# Patient Record
Sex: FEMALE | Race: WHITE | Employment: UNEMPLOYED | URBAN - METROPOLITAN AREA
[De-identification: names, ages, dates, MRNs, and addresses within clinical notes are randomized per-mention and may not be internally consistent; named-entity substitution may affect disease eponyms.]

---

## 2021-03-05 ENCOUNTER — HOSPITAL ENCOUNTER (INPATIENT)
Age: 86
LOS: 5 days | Discharge: NURSING, REHAB OR LONG-TERM CARE | DRG: 184 | End: 2021-03-10
Attending: EMERGENCY MEDICINE | Admitting: SURGERY
Payer: MEDICARE

## 2021-03-05 ENCOUNTER — APPOINTMENT (OUTPATIENT)
Dept: CT IMAGING | Age: 86
DRG: 184 | End: 2021-03-05
Payer: MEDICARE

## 2021-03-05 DIAGNOSIS — S22.42XA CLOSED FRACTURE OF MULTIPLE RIBS OF LEFT SIDE, INITIAL ENCOUNTER: Primary | ICD-10-CM

## 2021-03-05 PROBLEM — Z87.81 HX OF FRACTURE OF RIB: Status: ACTIVE | Noted: 2021-03-05

## 2021-03-05 LAB
-: NORMAL
ALLEN TEST: ABNORMAL
ANION GAP SERPL CALCULATED.3IONS-SCNC: 13 MMOL/L (ref 9–17)
BLOOD BANK SPECIMEN: ABNORMAL
BUN BLDV-MCNC: 16 MG/DL (ref 8–23)
CARBOXYHEMOGLOBIN: ABNORMAL %
CHLORIDE BLD-SCNC: 101 MMOL/L (ref 98–107)
CO2: 19 MMOL/L (ref 20–31)
CREAT SERPL-MCNC: 0.57 MG/DL (ref 0.5–0.9)
ETHANOL PERCENT: <0.01 %
ETHANOL: <10 MG/DL
FIO2: ABNORMAL
GFR AFRICAN AMERICAN: >60 ML/MIN
GFR NON-AFRICAN AMERICAN: >60 ML/MIN
GFR SERPL CREATININE-BSD FRML MDRD: ABNORMAL ML/MIN/{1.73_M2}
GFR SERPL CREATININE-BSD FRML MDRD: ABNORMAL ML/MIN/{1.73_M2}
GLUCOSE BLD-MCNC: 141 MG/DL (ref 70–99)
HCG QUALITATIVE: NEGATIVE
HCO3 VENOUS: ABNORMAL MMOL/L (ref 24–30)
HCT VFR BLD CALC: 46.2 % (ref 36.3–47.1)
HEMOGLOBIN: 15.1 G/DL (ref 11.9–15.1)
INR BLD: 1.1
MCH RBC QN AUTO: 28.7 PG (ref 25.2–33.5)
MCHC RBC AUTO-ENTMCNC: 32.7 G/DL (ref 28.4–34.8)
MCV RBC AUTO: 87.8 FL (ref 82.6–102.9)
METHEMOGLOBIN: ABNORMAL %
MODE: ABNORMAL
NEGATIVE BASE EXCESS, VEN: ABNORMAL MMOL/L (ref 0–2)
NOTIFICATION TIME: ABNORMAL
NOTIFICATION: ABNORMAL
NRBC AUTOMATED: 0 PER 100 WBC
O2 DEVICE/FLOW/%: ABNORMAL
O2 SAT, VEN: ABNORMAL %
OXYHEMOGLOBIN: ABNORMAL % (ref 95–98)
PARTIAL THROMBOPLASTIN TIME: 18.8 SEC (ref 20.5–30.5)
PATIENT TEMP: ABNORMAL
PCO2, VEN, TEMP ADJ: ABNORMAL MMHG (ref 39–55)
PCO2, VEN: ABNORMAL (ref 39–55)
PDW BLD-RTO: 12.7 % (ref 11.8–14.4)
PEEP/CPAP: ABNORMAL
PH VENOUS: ABNORMAL (ref 7.32–7.42)
PH, VEN, TEMP ADJ: ABNORMAL (ref 7.32–7.42)
PLATELET # BLD: 256 K/UL (ref 138–453)
PMV BLD AUTO: 11.2 FL (ref 8.1–13.5)
PO2, VEN, TEMP ADJ: ABNORMAL MMHG (ref 30–50)
PO2, VEN: ABNORMAL (ref 30–50)
POSITIVE BASE EXCESS, VEN: ABNORMAL MMOL/L (ref 0–2)
POTASSIUM SERPL-SCNC: 4.3 MMOL/L (ref 3.7–5.3)
PROTHROMBIN TIME: 11.3 SEC (ref 9.1–12.3)
PSV: ABNORMAL
PT. POSITION: ABNORMAL
RBC # BLD: 5.26 M/UL (ref 3.95–5.11)
REASON FOR REJECTION: NORMAL
RESPIRATORY RATE: ABNORMAL
SAMPLE SITE: ABNORMAL
SARS-COV-2: NEGATIVE
SET RATE: ABNORMAL
SODIUM BLD-SCNC: 133 MMOL/L (ref 135–144)
TEXT FOR RESPIRATORY: ABNORMAL
TOTAL HB: ABNORMAL G/DL (ref 12–16)
TOTAL RATE: ABNORMAL
VT: ABNORMAL
WBC # BLD: 15 K/UL (ref 3.5–11.3)
ZZ NTE CLEAN UP: ORDERED TEST: NORMAL
ZZ NTE WITH NAME CLEAN UP: SPECIMEN SOURCE: NORMAL

## 2021-03-05 PROCEDURE — 82947 ASSAY GLUCOSE BLOOD QUANT: CPT

## 2021-03-05 PROCEDURE — 84520 ASSAY OF UREA NITROGEN: CPT

## 2021-03-05 PROCEDURE — 85730 THROMBOPLASTIN TIME PARTIAL: CPT

## 2021-03-05 PROCEDURE — 84703 CHORIONIC GONADOTROPIN ASSAY: CPT

## 2021-03-05 PROCEDURE — 2500000003 HC RX 250 WO HCPCS: Performed by: STUDENT IN AN ORGANIZED HEALTH CARE EDUCATION/TRAINING PROGRAM

## 2021-03-05 PROCEDURE — 6360000002 HC RX W HCPCS: Performed by: STUDENT IN AN ORGANIZED HEALTH CARE EDUCATION/TRAINING PROGRAM

## 2021-03-05 PROCEDURE — 3209999900 CT LUMBAR SPINE TRAUMA RECONSTRUCTION

## 2021-03-05 PROCEDURE — 80051 ELECTROLYTE PANEL: CPT

## 2021-03-05 PROCEDURE — 85027 COMPLETE CBC AUTOMATED: CPT

## 2021-03-05 PROCEDURE — 93005 ELECTROCARDIOGRAM TRACING: CPT

## 2021-03-05 PROCEDURE — 82565 ASSAY OF CREATININE: CPT

## 2021-03-05 PROCEDURE — 82805 BLOOD GASES W/O2 SATURATION: CPT

## 2021-03-05 PROCEDURE — 2580000003 HC RX 258: Performed by: STUDENT IN AN ORGANIZED HEALTH CARE EDUCATION/TRAINING PROGRAM

## 2021-03-05 PROCEDURE — 99283 EMERGENCY DEPT VISIT LOW MDM: CPT

## 2021-03-05 PROCEDURE — 3209999900 CT THORACIC SPINE TRAUMA RECONSTRUCTION

## 2021-03-05 PROCEDURE — 6360000004 HC RX CONTRAST MEDICATION: Performed by: STUDENT IN AN ORGANIZED HEALTH CARE EDUCATION/TRAINING PROGRAM

## 2021-03-05 PROCEDURE — 6370000000 HC RX 637 (ALT 250 FOR IP): Performed by: STUDENT IN AN ORGANIZED HEALTH CARE EDUCATION/TRAINING PROGRAM

## 2021-03-05 PROCEDURE — 2000000000 HC ICU R&B

## 2021-03-05 PROCEDURE — 85610 PROTHROMBIN TIME: CPT

## 2021-03-05 PROCEDURE — 71260 CT THORAX DX C+: CPT

## 2021-03-05 PROCEDURE — G0480 DRUG TEST DEF 1-7 CLASSES: HCPCS

## 2021-03-05 RX ORDER — METHOCARBAMOL 500 MG/1
750 TABLET, FILM COATED ORAL EVERY 6 HOURS SCHEDULED
Status: DISCONTINUED | OUTPATIENT
Start: 2021-03-06 | End: 2021-03-09

## 2021-03-05 RX ORDER — SODIUM CHLORIDE 0.9 % (FLUSH) 0.9 %
10 SYRINGE (ML) INJECTION EVERY 12 HOURS SCHEDULED
Status: DISCONTINUED | OUTPATIENT
Start: 2021-03-05 | End: 2021-03-10 | Stop reason: HOSPADM

## 2021-03-05 RX ORDER — IBUPROFEN 400 MG/1
400 TABLET ORAL EVERY 6 HOURS SCHEDULED
Status: DISCONTINUED | OUTPATIENT
Start: 2021-03-06 | End: 2021-03-10

## 2021-03-05 RX ORDER — ONDANSETRON 2 MG/ML
4 INJECTION INTRAMUSCULAR; INTRAVENOUS EVERY 6 HOURS PRN
Status: DISCONTINUED | OUTPATIENT
Start: 2021-03-05 | End: 2021-03-08

## 2021-03-05 RX ORDER — PROMETHAZINE HYDROCHLORIDE 12.5 MG/1
12.5 TABLET ORAL EVERY 6 HOURS PRN
Status: DISCONTINUED | OUTPATIENT
Start: 2021-03-05 | End: 2021-03-08

## 2021-03-05 RX ORDER — SODIUM CHLORIDE 0.9 % (FLUSH) 0.9 %
10 SYRINGE (ML) INJECTION PRN
Status: DISCONTINUED | OUTPATIENT
Start: 2021-03-05 | End: 2021-03-10 | Stop reason: HOSPADM

## 2021-03-05 RX ORDER — ACETAMINOPHEN 500 MG
1000 TABLET ORAL EVERY 8 HOURS SCHEDULED
Status: DISCONTINUED | OUTPATIENT
Start: 2021-03-05 | End: 2021-03-10 | Stop reason: HOSPADM

## 2021-03-05 RX ORDER — POLYETHYLENE GLYCOL 3350 17 G/17G
17 POWDER, FOR SOLUTION ORAL DAILY PRN
Status: DISCONTINUED | OUTPATIENT
Start: 2021-03-05 | End: 2021-03-10

## 2021-03-05 RX ORDER — GABAPENTIN 300 MG/1
300 CAPSULE ORAL EVERY 8 HOURS SCHEDULED
Status: DISCONTINUED | OUTPATIENT
Start: 2021-03-05 | End: 2021-03-09

## 2021-03-05 RX ADMIN — GABAPENTIN 300 MG: 300 CAPSULE ORAL at 23:58

## 2021-03-05 RX ADMIN — IOPAMIDOL 75 ML: 755 INJECTION, SOLUTION INTRAVENOUS at 19:05

## 2021-03-05 RX ADMIN — ENOXAPARIN SODIUM 30 MG: 30 INJECTION SUBCUTANEOUS at 23:57

## 2021-03-05 RX ADMIN — ACETAMINOPHEN 1000 MG: 500 TABLET ORAL at 22:36

## 2021-03-05 RX ADMIN — METHOCARBAMOL TABLETS 750 MG: 500 TABLET, COATED ORAL at 23:58

## 2021-03-05 RX ADMIN — KETAMINE HYDROCHLORIDE 0.2 MG/KG/HR: 50 INJECTION INTRAMUSCULAR; INTRAVENOUS at 20:54

## 2021-03-05 RX ADMIN — IBUPROFEN 400 MG: 400 TABLET, FILM COATED ORAL at 23:58

## 2021-03-05 ASSESSMENT — ENCOUNTER SYMPTOMS
COUGH: 0
SORE THROAT: 0
PHOTOPHOBIA: 0
SHORTNESS OF BREATH: 0
BACK PAIN: 1

## 2021-03-05 ASSESSMENT — PAIN SCALES - GENERAL
PAINLEVEL_OUTOF10: 6
PAINLEVEL_OUTOF10: 5

## 2021-03-05 ASSESSMENT — PAIN DESCRIPTION - LOCATION
LOCATION: RIB CAGE
LOCATION: RIB CAGE

## 2021-03-05 ASSESSMENT — PAIN DESCRIPTION - PAIN TYPE: TYPE: ACUTE PAIN

## 2021-03-05 ASSESSMENT — PAIN DESCRIPTION - FREQUENCY: FREQUENCY: CONTINUOUS

## 2021-03-05 ASSESSMENT — PAIN DESCRIPTION - ONSET: ONSET: UNABLE TO TELL

## 2021-03-05 ASSESSMENT — PAIN DESCRIPTION - ORIENTATION
ORIENTATION: LEFT
ORIENTATION: LEFT;POSTERIOR

## 2021-03-05 NOTE — ED NOTES
85f  Mb  Found on floor this am uknown down time. Dementia    % on room air   102bpm.   Bp 148/98  No blood thinners    Memantine donepazil. No head injury CT H&N negative    Multiple rib 3456 L       No pneumo on xray.         Julieta Ann, RN  03/05/21 5554

## 2021-03-05 NOTE — H&P
TRAUMA HISTORY AND PHYSICAL EXAMINATION    PATIENT NAME: Micheline Romo  YOB: 1935  MEDICAL RECORD NO. [id number]   DATE: 3/5/2021  PRIMARY CARE PHYSICIAN: No primary care provider on file. ACTIVATION   []Trauma Alert     [] Trauma Priority     [x]Trauma Consult. IMPRESSION:     Fall    MEDICAL DECISION MAKING AND PLAN:       Fall, unknown down time, Txr from [facility]    · Completion scan: CT C/A/P pending  · Inj: L 3-6 rib fx  · Trauma panel  · Consults:  ·  - pending scan  · Rib score: 9  · IS: 500  · Pain: MMT  · Diet: NPO  · Admission pending completion scan. Likely Step down given rib score        CONSULT SERVICES    [] Neurosurgery     [] Orthopedic Surgery    [] Cardiothoracic     [] Facial Trauma    [] Plastic Surgery (Burn)    [] Pediatric Surgery     [] Internal Medicine    [] Pulmonary Medicine    [] Other:        HISTORY:     Chief Complaint:  \"My ribs hurt\"    INJURY SUMMARY  L 3-6 rib fx    If intracranial hemorrhage is present, is it a BIG 1 category: [] YES  []NO    GENERAL DATA  Age 80 y.o.  female   Patient information was obtained from patient. History/Exam limitations: none. Patient presented to the Emergency Department by ambulance   Injury Date: 3/5/2021   Approximate Injury Time: unknown        Transport mode:   [x]Ambulance      [] Helicopter     []Car       [] Other  Referring Hospital: [address] [place] [address],   In her assisted living facility     MECHANISM OF INJURY         [x] Fall    [x]From Standing     []From Height  Ft     []Down Stairs ___steps    HISTORY:     Michleine Romo is a 80 y.o. female that presented to the Emergency Department as a transfer from [address] [place] [address] following a fall. Pt does not recall the fall and does not know if she was inside or outside when she fell. She does recall that she could not get up. Pt was found when someone came over to bring her meals. Unknown down time, either today or yesterday.  Imaging from [facility] showed negative C/T/L tenderness to palpation  EXTREMITIES: Without gross deformity, radial and PT/DP pulses +2 b/l  MSK:  No tenderness to palpation throughout, without gross deformity. C/T/L spine without TTP  NEURO:  A&Ox2, CN 2-12 grossly intact, sensation to light touch grossly intact BUE & BLE, motor strength 5/5  strength, wiggles toes, repositions self in bed independently  SKIN: Warm and dry    FOCUSED ABDOMINAL SONOGRAM FOR TRAUMA (FAST): A good  quality examination was performed by Dr. Neelam Muhammad and representative images were obtained.     [x] No free fluid in the abdomen   [] Free fluid in RUQ   [] Free fluid in LUQ  [] Free fluid in Pelvis  [] Pericardial fluid  [] Other:        RADIOLOGY  CT CHEST ABDOMEN PELVIS W CONTRAST    (Results Pending)         LABS    Labs Reviewed - No data to display      Raul Mckay DO  3/5/21, 5:50 PM

## 2021-03-05 NOTE — ED NOTES
Bed: 01  Expected date: 3/5/21  Expected time:   Means of arrival:   Comments:  JOSEMANUEL Muñoz  03/05/21 5742

## 2021-03-05 NOTE — ED PROVIDER NOTES
[facility]  ED  Emergency Department Encounter  Emergency Medicine Resident     Pt Name: Marilynn Gray  MRN: [id number]  Cayden 1935  Date of evaluation: 3/5/21  PCP:  Nohelia Lin  [id number]       Chief Complaint   Patient presents with    Rib Pain       HISTORY OFPRESENT ILLNESS  (Location/Symptom, Timing/Onset, Context/Setting, Quality, Duration, Modifying Factors,Severity.)      Marilynn Gray is a 80 y. o.yo female who presents with mechanical fall at the nursing home facility where she resides. Patient states that she was on the floor, with the hopes of somebody finding her. She is unsure if she fell yesterday or today. Patient has short-term memory defect in which she takes medication for. Patient complains of left-sided rib pain, no cervical spine tenderness or thoracic or lumbar spine tenderness. Patient also complains of left shoulder pain and left knee pain. She denies any loss of consciousness, denies any blood thinner usage. Patient was taken to order, they obtained a CT head which was negative for bleed, CT cervical spine was negative for fracture. Also obtain portable chest x-ray was demonstrated left rib fracture. Also obtain x-ray of left shoulder and x-ray of left tib-fib which were all negative for any acute fracture. She was subsequently transferred here for further work up. PAST MEDICAL / SURGICAL / SOCIAL / FAMILY HISTORY      has no past medical history on file. has no past surgical history on file.      Social History     Socioeconomic History    Marital status: Unknown     Spouse name: Not on file    Number of children: Not on file    Years of education: Not on file    Highest education level: Not on file   Occupational History    Not on file   Social Needs    Financial resource strain: Not on file    Food insecurity     Worry: Not on file     Inability: Not on file    Transportation needs     Medical: Not on file     Non-medical: Not on file   Tobacco Use    Smoking status: Not on file   Substance and Sexual Activity    Alcohol use: Not on file    Drug use: Not on file    Sexual activity: Not on file   Lifestyle    Physical activity     Days per week: Not on file     Minutes per session: Not on file    Stress: Not on file   Relationships    Social connections     Talks on phone: Not on file     Gets together: Not on file     Attends [employer]ism service: Not on file     Active member of club or organization: Not on file     Attends meetings of clubs or organizations: Not on file     Relationship status: Not on file    Intimate partner violence     Fear of current or ex partner: Not on file     Emotionally abused: Not on file     Physically abused: Not on file     Forced sexual activity: Not on file   Other Topics Concern    Not on file   Social History Narrative    Not on file       No family history on file. Allergies:  Patient has no allergy information on record. Home Medications:  Prior to Admission medications    Not on File       REVIEW OFSYSTEMS    (2-9 systems for level 4, 10 or more for level 5)      Review of Systems   Constitutional: Negative for fatigue and fever. HENT: Negative for sneezing and sore throat. Eyes: Negative for photophobia and visual disturbance. Respiratory: Negative for cough and shortness of breath. Cardiovascular: Positive for chest pain (Anterior left chest wall pain). Musculoskeletal: Positive for back pain (Posterior left shoulder pain). Negative for neck pain and neck stiffness. Neurological: Negative for light-headedness and headaches.        PHYSICAL EXAM   (up to 7 for level 4, 8 or more forlevel 5)      INITIAL VITALS:   ED Triage Vitals   BP Temp Temp Source Pulse Resp SpO2 Height Weight   03/05/21 1718 03/05/21 1754 03/05/21 1754 03/05/21 1724 03/05/21 1724 03/05/21 1718 -- --   (!) 145/103 98.1 °F (36.7 °C) Oral 102 23 94 %         Physical Exam  Constitutional: Appearance: Normal appearance. HENT:      Head: Normocephalic and atraumatic. Right Ear: Tympanic membrane normal.      Left Ear: Tympanic membrane normal.      Mouth/Throat:      Mouth: Mucous membranes are moist.   Eyes:      Extraocular Movements: Extraocular movements intact. Pupils: Pupils are equal, round, and reactive to light. Neck:      Musculoskeletal: No neck rigidity or muscular tenderness. Cardiovascular:      Rate and Rhythm: Tachycardia present. Heart sounds: Murmur present. Pulmonary:      Effort: Pulmonary effort is normal. No respiratory distress. Breath sounds: No stridor. Abdominal:      General: There is no distension. Tenderness: There is no abdominal tenderness. Musculoskeletal:         General: Tenderness (Over L knee and L shoulder) present. No swelling, deformity or signs of injury. Skin:     Capillary Refill: Capillary refill takes less than 2 seconds. Coloration: Skin is not jaundiced. Findings: No bruising. Neurological:      General: No focal deficit present. Mental Status: She is alert and oriented to person, place, and time. Psychiatric:         Mood and Affect: Mood normal.         Behavior: Behavior normal.         DIFFERENTIAL  DIAGNOSIS     PLAN (LABS / IMAGING / EKG):  Orders Placed This Encounter   Procedures    CT CHEST ABDOMEN PELVIS W CONTRAST    CT LUMBAR SPINE TRAUMA RECONSTRUCTION    CT THORACIC SPINE TRAUMA RECONSTRUCTION    TRAUMA PANEL    SPECIMEN REJECTION    Inpatient consult to Trauma Surgery    EKG 12 Lead    PATIENT STATUS (FROM ED OR OR/PROCEDURAL) Inpatient       MEDICATIONS ORDERED:  No orders of the defined types were placed in this encounter. DDX: Fall with rib fractures    Initial MDM/Plan: 80 y.o. female who presents with mechanical fall. Patient alert, oriented although she is poor historian. No obvious head traumatic injury.   Cervical spine nontender on palpation, pupils equal and reactive, lungs clear to auscultate bilaterally, presence of aortic stenosis murmur radiating to the carotids. Abdomen soft nontender nondistended. No bilateral lower extremity edema. Pulses intact in upper and lower extremity. Motor strength and sensation intact in upper and lower extremities. Plan for trauma consult, incentive spirometry patient: 500 cc,   bedside fast exam negative for free fluid in abdomen. Patient to be admitted. DIAGNOSTIC RESULTS / EMERGENCYDEPARTMENT COURSE / MDM     LABS:  Labs Reviewed   TRAUMA PANEL - Abnormal; Notable for the following components:       Result Value    WBC 15.0 (*)     RBC 5.26 (*)     PTT 18.8 (*)     All other components within normal limits   SPECIMEN REJECTION         RADIOLOGY:  No results found. EKG  EKG Interpretation    Interpreted by me    Rhythm: normal sinus   Rate: normal  Axis: normal  Ectopy: Premature ventricular complexes  Conduction: normal  ST Segments: no acute change  T Waves: no acute change  Q Waves: none    Clinical Impression: No previous EKG for comparison    All EKG's are interpreted by the Emergency Department Physicianwho either signs or Co-signs this chart in the absence of a cardiologist.    EMERGENCY DEPARTMENT COURSE:          PROCEDURES:  None    CONSULTS:  IP CONSULT TO TRAUMA SURGERY    CRITICAL CARE:      FINAL IMPRESSION      1. Closed fracture of multiple ribs of left side, initial encounter          DISPOSITION / [facility]. [address] Admitted 03/05/2021 06:04:53 PM      PATIENT REFERRED TO:  No follow-up provider specified.     DISCHARGE MEDICATIONS:  New Prescriptions    No medications on file       Jhoan Salazar MD  Emergency Medicine Resident    (Please note that portions of this note were completed with a voice recognition program.Efforts were made to edit the dictations but occasionally words are mis-transcribed.)      Jhoan Salazar MD  Resident  03/05/21 [address] Alivia Barriga MD  Resident  03/05/21 9096

## 2021-03-06 ENCOUNTER — APPOINTMENT (OUTPATIENT)
Dept: GENERAL RADIOLOGY | Age: 86
DRG: 184 | End: 2021-03-06
Payer: MEDICARE

## 2021-03-06 LAB
ABSOLUTE EOS #: 0 K/UL (ref 0–0.44)
ABSOLUTE IMMATURE GRANULOCYTE: 0 K/UL (ref 0–0.3)
ABSOLUTE LYMPH #: 2.65 K/UL (ref 1.1–3.7)
ABSOLUTE MONO #: 1.76 K/UL (ref 0.1–1.2)
ANION GAP SERPL CALCULATED.3IONS-SCNC: 12 MMOL/L (ref 9–17)
ANION GAP SERPL CALCULATED.3IONS-SCNC: 12 MMOL/L (ref 9–17)
BASOPHILS # BLD: 1 % (ref 0–2)
BASOPHILS ABSOLUTE: 0.13 K/UL (ref 0–0.2)
BUN BLDV-MCNC: 19 MG/DL (ref 8–23)
BUN BLDV-MCNC: 25 MG/DL (ref 8–23)
BUN/CREAT BLD: ABNORMAL (ref 9–20)
BUN/CREAT BLD: NORMAL (ref 9–20)
CALCIUM SERPL-MCNC: 8.7 MG/DL (ref 8.6–10.4)
CALCIUM SERPL-MCNC: 8.7 MG/DL (ref 8.6–10.4)
CHLORIDE BLD-SCNC: 100 MMOL/L (ref 98–107)
CHLORIDE BLD-SCNC: 106 MMOL/L (ref 98–107)
CO2: 20 MMOL/L (ref 20–31)
CO2: 21 MMOL/L (ref 20–31)
CREAT SERPL-MCNC: 0.7 MG/DL (ref 0.5–0.9)
CREAT SERPL-MCNC: 0.97 MG/DL (ref 0.5–0.9)
DIFFERENTIAL TYPE: ABNORMAL
EKG ATRIAL RATE: 91 BPM
EKG P AXIS: 79 DEGREES
EKG P-R INTERVAL: 170 MS
EKG Q-T INTERVAL: 396 MS
EKG QRS DURATION: 92 MS
EKG QTC CALCULATION (BAZETT): 487 MS
EKG R AXIS: -8 DEGREES
EKG T AXIS: 46 DEGREES
EKG VENTRICULAR RATE: 91 BPM
EOSINOPHILS RELATIVE PERCENT: 0 % (ref 1–4)
GFR AFRICAN AMERICAN: >60 ML/MIN
GFR AFRICAN AMERICAN: >60 ML/MIN
GFR NON-AFRICAN AMERICAN: 55 ML/MIN
GFR NON-AFRICAN AMERICAN: >60 ML/MIN
GFR SERPL CREATININE-BSD FRML MDRD: ABNORMAL ML/MIN/{1.73_M2}
GFR SERPL CREATININE-BSD FRML MDRD: ABNORMAL ML/MIN/{1.73_M2}
GFR SERPL CREATININE-BSD FRML MDRD: NORMAL ML/MIN/{1.73_M2}
GFR SERPL CREATININE-BSD FRML MDRD: NORMAL ML/MIN/{1.73_M2}
GLUCOSE BLD-MCNC: 160 MG/DL (ref 70–99)
GLUCOSE BLD-MCNC: 97 MG/DL (ref 70–99)
HCT VFR BLD CALC: 45.3 % (ref 36.3–47.1)
HEMOGLOBIN: 14.3 G/DL (ref 11.9–15.1)
IMMATURE GRANULOCYTES: 0 %
LYMPHOCYTES # BLD: 21 % (ref 24–43)
MAGNESIUM: 2.1 MG/DL (ref 1.6–2.6)
MCH RBC QN AUTO: 28.2 PG (ref 25.2–33.5)
MCHC RBC AUTO-ENTMCNC: 31.6 G/DL (ref 28.4–34.8)
MCV RBC AUTO: 89.3 FL (ref 82.6–102.9)
MONOCYTES # BLD: 14 % (ref 3–12)
MORPHOLOGY: NORMAL
MYOGLOBIN: 124 NG/ML (ref 25–58)
NRBC AUTOMATED: 0 PER 100 WBC
PDW BLD-RTO: 13.1 % (ref 11.8–14.4)
PHOSPHORUS: 3.9 MG/DL (ref 2.6–4.5)
PLATELET # BLD: 237 K/UL (ref 138–453)
PLATELET ESTIMATE: ABNORMAL
PMV BLD AUTO: 10.9 FL (ref 8.1–13.5)
POTASSIUM SERPL-SCNC: 3.7 MMOL/L (ref 3.7–5.3)
POTASSIUM SERPL-SCNC: 4 MMOL/L (ref 3.7–5.3)
RBC # BLD: 5.07 M/UL (ref 3.95–5.11)
RBC # BLD: ABNORMAL 10*6/UL
SEG NEUTROPHILS: 64 % (ref 36–65)
SEGMENTED NEUTROPHILS ABSOLUTE COUNT: 8.06 K/UL (ref 1.5–8.1)
SODIUM BLD-SCNC: 133 MMOL/L (ref 135–144)
SODIUM BLD-SCNC: 138 MMOL/L (ref 135–144)
TOTAL CK: 170 U/L (ref 26–192)
WBC # BLD: 12.6 K/UL (ref 3.5–11.3)
WBC # BLD: ABNORMAL 10*3/UL

## 2021-03-06 PROCEDURE — 51701 INSERT BLADDER CATHETER: CPT

## 2021-03-06 PROCEDURE — 71045 X-RAY EXAM CHEST 1 VIEW: CPT

## 2021-03-06 PROCEDURE — 2580000003 HC RX 258: Performed by: STUDENT IN AN ORGANIZED HEALTH CARE EDUCATION/TRAINING PROGRAM

## 2021-03-06 PROCEDURE — 6360000002 HC RX W HCPCS: Performed by: STUDENT IN AN ORGANIZED HEALTH CARE EDUCATION/TRAINING PROGRAM

## 2021-03-06 PROCEDURE — 2000000000 HC ICU R&B

## 2021-03-06 PROCEDURE — 97116 GAIT TRAINING THERAPY: CPT

## 2021-03-06 PROCEDURE — 97535 SELF CARE MNGMENT TRAINING: CPT

## 2021-03-06 PROCEDURE — 84100 ASSAY OF PHOSPHORUS: CPT

## 2021-03-06 PROCEDURE — 6370000000 HC RX 637 (ALT 250 FOR IP): Performed by: STUDENT IN AN ORGANIZED HEALTH CARE EDUCATION/TRAINING PROGRAM

## 2021-03-06 PROCEDURE — 97162 PT EVAL MOD COMPLEX 30 MIN: CPT

## 2021-03-06 PROCEDURE — 36415 COLL VENOUS BLD VENIPUNCTURE: CPT

## 2021-03-06 PROCEDURE — 51798 US URINE CAPACITY MEASURE: CPT

## 2021-03-06 PROCEDURE — 82550 ASSAY OF CK (CPK): CPT

## 2021-03-06 PROCEDURE — 73562 X-RAY EXAM OF KNEE 3: CPT

## 2021-03-06 PROCEDURE — 83874 ASSAY OF MYOGLOBIN: CPT

## 2021-03-06 PROCEDURE — 85025 COMPLETE CBC W/AUTO DIFF WBC: CPT

## 2021-03-06 PROCEDURE — 83735 ASSAY OF MAGNESIUM: CPT

## 2021-03-06 PROCEDURE — 2700000000 HC OXYGEN THERAPY PER DAY

## 2021-03-06 PROCEDURE — 80048 BASIC METABOLIC PNL TOTAL CA: CPT

## 2021-03-06 PROCEDURE — 94761 N-INVAS EAR/PLS OXIMETRY MLT: CPT

## 2021-03-06 PROCEDURE — 97166 OT EVAL MOD COMPLEX 45 MIN: CPT

## 2021-03-06 PROCEDURE — 73030 X-RAY EXAM OF SHOULDER: CPT

## 2021-03-06 RX ORDER — MEMANTINE HYDROCHLORIDE 28 MG/1
28 CAPSULE, EXTENDED RELEASE ORAL DAILY
COMMUNITY

## 2021-03-06 RX ORDER — DONEPEZIL HYDROCHLORIDE 10 MG/1
10 TABLET, FILM COATED ORAL NIGHTLY
COMMUNITY

## 2021-03-06 RX ORDER — MAGNESIUM SULFATE IN WATER 40 MG/ML
2000 INJECTION, SOLUTION INTRAVENOUS ONCE
Status: COMPLETED | OUTPATIENT
Start: 2021-03-06 | End: 2021-03-06

## 2021-03-06 RX ORDER — SODIUM CHLORIDE, SODIUM LACTATE, POTASSIUM CHLORIDE, AND CALCIUM CHLORIDE .6; .31; .03; .02 G/100ML; G/100ML; G/100ML; G/100ML
500 INJECTION, SOLUTION INTRAVENOUS ONCE
Status: COMPLETED | OUTPATIENT
Start: 2021-03-06 | End: 2021-03-06

## 2021-03-06 RX ORDER — MEMANTINE HYDROCHLORIDE 28 MG/1
28 CAPSULE, EXTENDED RELEASE ORAL DAILY
Status: DISCONTINUED | OUTPATIENT
Start: 2021-03-06 | End: 2021-03-06 | Stop reason: CLARIF

## 2021-03-06 RX ORDER — DONEPEZIL HYDROCHLORIDE 10 MG/1
10 TABLET, FILM COATED ORAL NIGHTLY
Status: DISCONTINUED | OUTPATIENT
Start: 2021-03-06 | End: 2021-03-10 | Stop reason: HOSPADM

## 2021-03-06 RX ORDER — SODIUM CHLORIDE, SODIUM LACTATE, POTASSIUM CHLORIDE, CALCIUM CHLORIDE 600; 310; 30; 20 MG/100ML; MG/100ML; MG/100ML; MG/100ML
INJECTION, SOLUTION INTRAVENOUS CONTINUOUS
Status: DISCONTINUED | OUTPATIENT
Start: 2021-03-06 | End: 2021-03-07

## 2021-03-06 RX ORDER — MEMANTINE HYDROCHLORIDE 5 MG/1
10 TABLET ORAL 2 TIMES DAILY
Status: DISCONTINUED | OUTPATIENT
Start: 2021-03-06 | End: 2021-03-10 | Stop reason: HOSPADM

## 2021-03-06 RX ADMIN — METHOCARBAMOL TABLETS 750 MG: 500 TABLET, COATED ORAL at 23:25

## 2021-03-06 RX ADMIN — IBUPROFEN 400 MG: 400 TABLET, FILM COATED ORAL at 13:10

## 2021-03-06 RX ADMIN — SODIUM CHLORIDE, PRESERVATIVE FREE 10 ML: 5 INJECTION INTRAVENOUS at 08:19

## 2021-03-06 RX ADMIN — GABAPENTIN 300 MG: 300 CAPSULE ORAL at 14:52

## 2021-03-06 RX ADMIN — SODIUM CHLORIDE, POTASSIUM CHLORIDE, SODIUM LACTATE AND CALCIUM CHLORIDE: 600; 310; 30; 20 INJECTION, SOLUTION INTRAVENOUS at 20:40

## 2021-03-06 RX ADMIN — ACETAMINOPHEN 1000 MG: 500 TABLET ORAL at 21:05

## 2021-03-06 RX ADMIN — IBUPROFEN 400 MG: 400 TABLET, FILM COATED ORAL at 06:08

## 2021-03-06 RX ADMIN — MAGNESIUM SULFATE 2000 MG: 2 INJECTION INTRAVENOUS at 08:34

## 2021-03-06 RX ADMIN — ENOXAPARIN SODIUM 30 MG: 30 INJECTION SUBCUTANEOUS at 08:19

## 2021-03-06 RX ADMIN — IBUPROFEN 400 MG: 400 TABLET, FILM COATED ORAL at 18:04

## 2021-03-06 RX ADMIN — ACETAMINOPHEN 1000 MG: 500 TABLET ORAL at 06:11

## 2021-03-06 RX ADMIN — SODIUM CHLORIDE, PRESERVATIVE FREE 10 ML: 5 INJECTION INTRAVENOUS at 20:55

## 2021-03-06 RX ADMIN — MEMANTINE HYDROCHLORIDE 10 MG: 5 TABLET, FILM COATED ORAL at 20:55

## 2021-03-06 RX ADMIN — METHOCARBAMOL TABLETS 750 MG: 500 TABLET, COATED ORAL at 06:08

## 2021-03-06 RX ADMIN — GABAPENTIN 300 MG: 300 CAPSULE ORAL at 08:19

## 2021-03-06 RX ADMIN — GABAPENTIN 300 MG: 300 CAPSULE ORAL at 21:40

## 2021-03-06 RX ADMIN — MEMANTINE HYDROCHLORIDE 10 MG: 5 TABLET, FILM COATED ORAL at 14:51

## 2021-03-06 RX ADMIN — DONEPEZIL HYDROCHLORIDE 10 MG: 10 TABLET, FILM COATED ORAL at 20:55

## 2021-03-06 RX ADMIN — METHOCARBAMOL TABLETS 750 MG: 500 TABLET, COATED ORAL at 18:05

## 2021-03-06 RX ADMIN — ENOXAPARIN SODIUM 30 MG: 30 INJECTION SUBCUTANEOUS at 20:55

## 2021-03-06 RX ADMIN — IBUPROFEN 400 MG: 400 TABLET, FILM COATED ORAL at 23:25

## 2021-03-06 RX ADMIN — ACETAMINOPHEN 1000 MG: 500 TABLET ORAL at 14:54

## 2021-03-06 RX ADMIN — METHOCARBAMOL TABLETS 750 MG: 500 TABLET, COATED ORAL at 13:11

## 2021-03-06 RX ADMIN — SODIUM CHLORIDE, POTASSIUM CHLORIDE, SODIUM LACTATE AND CALCIUM CHLORIDE 500 ML: 600; 310; 30; 20 INJECTION, SOLUTION INTRAVENOUS at 13:14

## 2021-03-06 ASSESSMENT — PAIN DESCRIPTION - ORIENTATION
ORIENTATION: LEFT
ORIENTATION: LEFT

## 2021-03-06 ASSESSMENT — PAIN SCALES - GENERAL
PAINLEVEL_OUTOF10: 0
PAINLEVEL_OUTOF10: 4
PAINLEVEL_OUTOF10: 0

## 2021-03-06 ASSESSMENT — PAIN DESCRIPTION - ONSET: ONSET: ON-GOING

## 2021-03-06 ASSESSMENT — PAIN DESCRIPTION - PAIN TYPE: TYPE: ACUTE PAIN

## 2021-03-06 NOTE — PROGRESS NOTES
Pt c/o having to urinate but unable to go on bed pan nor with a pure wick in place. Spoke to Dr. Richelle Ferrara regarding the matter and bladder scanned pt to find she had greater than [id number] and she ordered for pt to have a straight cath done. Writer straight cath'd pt and 700 was removed. Pt states she was more comfortable and was able to fall asleep. Continuing to monitor.

## 2021-03-06 NOTE — PLAN OF CARE
Problem: Falls - Risk of:  Goal: Will remain free from falls  Description: Will remain free from falls  3/6/2021 1028 by Jerrell Weaver RN  Outcome: Ongoing  3/6/2021 0626 by Lucas Quintanilla RN  Outcome: Ongoing  Goal: Absence of physical injury  Description: Absence of physical injury  3/6/2021 1028 by Jerrell Weaver RN  Outcome: Ongoing  3/6/2021 0626 by Lucas Quintanilla RN  Outcome: Ongoing     Problem: Skin Integrity:  Goal: Will show no infection signs and symptoms  Description: Will show no infection signs and symptoms  Outcome: Ongoing     Problem: Pain:  Goal: Pain level will decrease  Description: Pain level will decrease  Outcome: Ongoing  Goal: Control of acute pain  Description: Control of acute pain  Outcome: Ongoing

## 2021-03-06 NOTE — PROGRESS NOTES
ICU PROGRESS NOTE        PATIENT NAME:  [facility] RECORD NO. [id number]  DATE: 3/6/2021    PRIMARY CARE PHYSICIAN: Delmis Staley    HD: # 1    ASSESSMENT    Patient Active Problem List   Diagnosis    Hx of fracture of rib       MEDICAL DECISION MAKING AND PLAN  1. Neuro:  1. Pain control: ketamine gtt, MMPT (tylenol, robaxin, motrin, christ)  2. CV  1. No known cardiac hx  2. HR 90s  3. MAP 79-91  4. Intermittent PVCs on monitor; will give additional Mg  3. Pulm  1. L rib 3-6 fxs; minimally displaced  2. Small L pleural effusion  3. HFNC 40/40  4. Encourage IS/acapella; IS 500cc this am  5. Albuterol q6  4. GI/Nutrition  1. General diet  5. Renal/lytes  1. DC fluids as tolerating general diet  2. Urinary retention overnight; bladder scan q4h  3. /4.0/106/20/19/0.7  4. Mg 2.1; will give additional Mg due to PVCs  5. I/O 24h: 360/700; net - 340  6. Heme  1. 14.3 (15.1)  2. Platelets 707  8. Endocrine        1. Monitor glucose; add HISS if needed  7. Musculoskeletal  1. PT/OT  8. Skin   1. No lesions  9. ID/Micro  1. Afebrile  10. Family/dispo  1. Will contact family to establish who is POA  11. Lines  1. PIV     CHECKLIST    CAM-ICU RASS: negative  RESTRAINTS: not indicated  IVF: hold at this time  NUTRITION: general diet  ANTIBIOTICS: not indicated  GI: not indicated  DVT: lovenox  GLYCEMIC CONTROL: well controlled at this time  HOB >45: ok  MOBILITY: ok for up out of bed    SUBJECTIVE    Mandy Muckle with no acute events overnight. Pain well controlled. Mild waxing/waning confusion is likely baseline. Afebrile. Did need straight cath x1 for urinary retention.        OBJECTIVE  VITALS: Temp: Temp: 98.7 °F (37.1 °C)Temp  Av.4 °F (36.9 °C)  Min: 98.1 °F (36.7 °C)  Max: 98.7 °F (33.9 °C) BP Systolic (89MEJ), XVU:036 , Min:96 , GJM:508   Diastolic (69TJM), YT, Min:50, Max:103   Pulse Pulse  Av.3  Min: 90  Max: 107 Resp Resp  Av.4  Min: 14  Max: 23 Pulse ox SpO2  Av.6 %  Min:

## 2021-03-06 NOTE — ED NOTES
Patient 94% on RA. Patient denies shortness of breath. Patient placed on 2L oxygen via NC for comfort. Patient given incentive spirometer with proper education on use.      Starla Kang RN  03/05/21 5282

## 2021-03-06 NOTE — PROGRESS NOTES
Occupational Therapy   Occupational Therapy Initial Assessment  Date: 3/6/2021   Patient Name: Jasmin Wagner  MRN: [id number]     : 1935    Date of Service: 3/6/2021    Discharge Recommendations:    No occupational therapy recommended at discharge. OT Equipment Recommendations  Equipment Needed: No    Assessment   Performance deficits / Impairments: Decreased functional mobility ; Decreased safe awareness;Decreased endurance;Decreased balance;Decreased high-level IADLs;Decreased ADL status  Prognosis: Good  Decision Making: Medium Complexity  OT Education: Plan of Care;OT Role;Transfer Training  Patient Education: OT role, POC, hand placement during sit<>stand transfers. Good return  REQUIRES OT FOLLOW UP: Yes  Activity Tolerance  Activity Tolerance: Patient Tolerated treatment well  Safety Devices  Safety Devices in place: Yes  Type of devices: Left in chair;Nurse notified;Call light within reach;Gait belt  Restraints  Initially in place: No           Patient Diagnosis(es): The encounter diagnosis was Closed fracture of multiple ribs of left side, initial encounter. has a past medical history of Breast cancer ([facility] [address] Dupuytren contracture, Hyperlipidemia, Malignant neoplasm of breast (female) ([address] Rib fractures, Short-term memory loss, Vertigo, and Vitamin D deficiency. has a past surgical history that includes Mastectomy (Right, ); Tonsillectomy (); Stapedes surgery (Bilateral); Foot surgery (); Dilation and curettage of uterus (); Hand surgery (9249/4952); Breast biopsy (Right, ); Breast reconstruction (Right, ); Cataract removal; Breast biopsy (Left, ); Colonoscopy; and Breast Implant Removal (2009). Restrictions  Restrictions/Precautions  Restrictions/Precautions: Fall Risk, high flow oxygen  Position Activity Restriction  Other position/activity restrictions:  Up with assist. Left rib fractures 4, 5, 6    Subjective   General  Patient assessed for rehabilitation Dressing: Contact guard assistance  Toileting: Contact guard assistance  Additional Comments: OT facilitated pt attempting toileting (unable to urinate) with CGA for transfer/func mob, donning/doffing sock seated in recliner with CGA d/t L rib pain  Tone RUE  RUE Tone: Normotonic  Tone LUE  LUE Tone: Normotonic  Coordination  Movements Are Fluid And Coordinated: Yes     Bed mobility  Comment: bed mobility not observed, pt in recliner at beginning of session and retired to recliner at end  Owen Schwab to stand: Moderate assistance;2 Person assistance  Stand to sit: Moderate assistance  Transfer Comments: with RW. Pt req'd mod v/c for hand placement during sit<>stand     Cognition  Overall Cognitive Status: Exceptions  Arousal/Alertness: Appropriate responses to stimuli  Following Commands: Follows one step commands with increased time; Follows one step commands with repetition  Attention Span: Attends with cues to redirect  Memory: Decreased recall of recent events;Decreased short term memory;Decreased recall of biographical Information  Safety Judgement: Decreased awareness of need for assistance;Decreased awareness of need for safety  Problem Solving: Assistance required to generate solutions;Assistance required to identify errors made;Assistance required to correct errors made;Decreased awareness of errors;Assistance required to implement solutions  Insights: Decreased awareness of deficits  Initiation: Requires cues for some  Sequencing: Requires cues for some        Sensation  Overall Sensation Status: Impaired(pt reports n/t in R ankle)      LUE AROM (degrees)  LUE AROM : WFL  Left Hand AROM (degrees)  Left Hand AROM: WFL  RUE AROM (degrees)  RUE AROM : WFL  Right Hand AROM (degrees)  Right Hand AROM: WFL  LUE Strength  Gross LUE Strength: WFL  L Hand General: 5/5  LUE Strength Comment: shldr/elbow not assessed d/t rib pain  RUE Strength  Gross RUE Strength: WFL  R Hand General: 5/5     Plan   Plan  Times per week: 1-3x/wk    AM-PAC Score  AM-PAC Inpatient Daily Activity Raw Score: 20 (03/06/21 1521)  AM-PAC Inpatient ADL T-Scale Score : 42.03 (03/06/21 1521)  ADL Inpatient CMS 0-100% Score: 38.32 (03/06/21 1521)  ADL Inpatient CMS G-Code Modifier : Trino Stallworth (03/06/21 1521)    Goals  Short term goals  Time Frame for Short term goals: pt will, by discharge  Short term goal 1: dem UB ADL ind/mod I with AE PRN  Short term goal 2: dem LB ADL/toileting with supervision and AE PRN  Short term goal 3: complete functional mobility/transfer with SBA and LRD PRN  Short term goal 4: follow 75% of one-step commands during functional activity  Short term goal 5: attend to functional activity with 2 or less v/c's for attention  Short term goal 6: engage in functional activity requiring dynamic standing for 10+ minutes with SBA and LRD PRN       Therapy Time   Individual Concurrent Group Co-treatment   Time In 1340         Time Out 1415         Minutes 35      Co-eval with PT   Timed Code Treatment Minutes: [address] [facility]

## 2021-03-06 NOTE — ED NOTES
Patient presents to ED via EMS from [facility] for trauma consult secondary to rib fractures s/p fall. Patient lives in a [facility] home where she has meals brought to her. Patient states she fell sometime last night or this morning (does not remember due to short term memory loss). Patient denies hitting her head. No LOC. Patient c/o left posterior rib pain. Patient had work up at [facility] including multiple xrays showing multiple left sided rib fractures and CT of head and neck that were negative. Patient denies any other symptoms.       Aundrea Cabezas RN  03/05/21 1910

## 2021-03-06 NOTE — PLAN OF CARE
Problem: Falls - Risk of:  Goal: Will remain free from falls  Description: Will remain free from falls  Outcome: Ongoing  Goal: Absence of physical injury  Description: Absence of physical injury  Outcome: Ongoing   Pt free from falls and injury. Bed locked in lowest position with 2/4 side rails up. Call light and personal belongings within reach.

## 2021-03-06 NOTE — PROGRESS NOTES
Physical Therapy    Facility/Department: [address]  Initial Assessment    NAME: Fina Garcia  : 1935  MRN: [id number]    Date of Service: 3/6/2021     Fall at home, down unknown quantity of time. Rib fractures. Discharge Recommendations:  Patient would benefit from continued therapy after discharge   PT Equipment Recommendations  Equipment Needed: Yes  Mobility Devices: Levonia Seller: Rolling    Assessment   Body structures, Functions, Activity limitations: Decreased functional mobility ; Decreased endurance;Decreased strength;Decreased coordination  Assessment: Significantly unsteady during gait with walker with high fall risk. Patient says she is weak and questioned whether she could ambulate at all. Though she has some remote assistance on the property of her home, she is essentially alone for part of the day at a time. Recommend 24 hour assistance in her current condition to prevent another fall and injury. Prognosis: Good  Decision Making: Medium Complexity  Clinical Presentation: evolving  PT Education: Goals;Transfer Training;PT Role;Functional Mobility Training;General Safety;Plan of Care;Gait Training  REQUIRES PT FOLLOW UP: Yes  Activity Tolerance  Activity Tolerance: Patient limited by cognitive status       Patient Diagnosis(es): The encounter diagnosis was Closed fracture of multiple ribs of left side, initial encounter. has a past medical history of Breast cancer ([facility] [address] Dupuytren contracture, Hyperlipidemia, Malignant neoplasm of breast (female) ([address] Rib fractures, Short-term memory loss, Vertigo, and Vitamin D deficiency. has a past surgical history that includes Mastectomy (Right, ); Tonsillectomy (194); Stapedes surgery (Bilateral); Foot surgery (); Dilation and curettage of uterus (); Hand surgery (3760/3278); Breast biopsy (Right, ); Breast reconstruction (Right, ); Cataract removal; Breast biopsy (Left, );  Colonoscopy; and Breast Implant Removal (08/2009). Restrictions  Restrictions/Precautions  Restrictions/Precautions: Fall Risk  Position Activity Restriction  Other position/activity restrictions: Up with assist. Left rib fractures 4, 5, 6  Vision/Hearing  Hearing: Exceptions to [facility]  Hearing Exceptions: Hard of hearing/hearing concerns     Subjective  General  Chart Reviewed: Yes  Patient assessed for rehabilitation services?: Yes  Family / Caregiver Present: Yes(two daughters)  Follows Commands: Impaired  Other (Comment): [place] and confused  Pain Screening  Patient Currently in Pain: Denies  Vital Signs  Patient Currently in Pain: Denies       Orientation  Orientation  Overall Orientation Status: Impaired  Orientation Level: Disoriented to time;Oriented to person;Oriented to place;Oriented to situation  Social/Functional History  Social/Functional History  Lives With: Alone  Type of Home: House(Home is a stand-alone house on the property of [employer]. 2x/day assistance from nursing staff.)  Home Layout: One level  Bathroom Shower/Tub: Walk-in shower  ADL Assistance: Independent  Homemaking Assistance: Needs assistance  Ambulation Assistance: Independent  Transfer Assistance: Independent  Active : No  Patient's  Info: [address] [facility]  Occupation: Retired  Cognition   Cognition  Overall Cognitive Status: Exceptions  Arousal/Alertness: Appropriate responses to stimuli  Following Commands: Follows one step commands with increased time; Follows one step commands with repetition  Attention Span: Attends with cues to redirect  Memory: Decreased recall of recent events;Decreased short term memory;Decreased recall of biographical Information  Safety Judgement: Decreased awareness of need for assistance;Decreased awareness of need for safety  Problem Solving: Assistance required to generate solutions;Assistance required to identify errors made;Assistance required to correct errors made;Assistance required to implement solutions  Insights: Decreased awareness of deficits  Initiation: Requires cues for some  Sequencing: Requires cues for some    Objective     Observation/Palpation  Observation: Thin and somewhat frail. On high-flow oxygen. Nurse approved switching to nasal cannula. Strength RLE  Strength RLE: Exception  Comment: 4/5 overall  Strength LLE  Strength LLE: Exception  Comment: 4/5 overall           Transfers  Sit to Stand: Moderate Assistance;2 Person Assistance(After several stands, she was able to stand from toilet with CGA with use of wall grab bar.)  Stand to sit: Moderate Assistance;2 Person Assistance  Ambulation  Ambulation?: Yes  Ambulation 1  Surface: level tile  Device: Rolling Walker  Assistance: Minimal assistance  Quality of Gait: Poor coordination of swing. She often overshoots her swing and makes initial contact on the backswing. She denies having sensation loss. At least one incident of scissoring gait which worsened steadiness. Gait Deviations: Deviated path  Distance: 80'     Balance  Sitting - Static: Fair  Sitting - Dynamic: Fair  Standing - Static: Fair;-  Standing - Dynamic: Poor  Comments: Mild posterior lean in static standing. Unsteady during gait. Plan   Plan  Times per week: 5-6x/week  Current Treatment Recommendations: Strengthening, Gait Training, Patient/Caregiver Education & Training, Functional Mobility Training, Endurance Training, Home Exercise Program, Transfer Training, Safety Education & Training  Safety Devices  Type of devices:  All fall risk precautions in place, Call light within reach, Gait belt, Nurse notified, Left in chair              AM-PAC Score  AM-PAC Inpatient Mobility Raw Score : 16 (03/06/21 1439)  AM-PAC Inpatient T-Scale Score : 40.78 (03/06/21 1439)  Mobility Inpatient CMS 0-100% Score: 54.16 (03/06/21 1439)  Mobility Inpatient CMS G-Code Modifier : CK (03/06/21 1439)          Goals  Short term goals  Time Frame for Short term goals: 14 visits  Short term goal 1: Sit to/from stand with supervision. Short term goal 2: Ambulate 150' with walker with SBA. Short term goal 3: Improve standing balance to fair as seen by ability to stand statically without UE support for 30 seconds.   Patient Goals   Patient goals : Go home       Therapy Time   Individual Concurrent Group Co-treatment   Time In 1340         Time Out 1415         Minutes 35         Timed Code Treatment Minutes: 8 Minutes       Bennett Moore, PT

## 2021-03-06 NOTE — PROGRESS NOTES
Trauma Tertiary Survey    Admit Date: 3/5/2021  Hospital day 1    Other fall from unknown height       Past Medical History:   Diagnosis Date    Breast cancer ([facility] [address]     Short-term memory loss        Scheduled Meds:   albuterol  2.5 mg Nebulization Q6H    sodium chloride flush  10 mL Intravenous 2 times per day    acetaminophen  1,000 mg Oral 3 times per day    enoxaparin  30 mg Subcutaneous BID    gabapentin  300 mg Oral 3 times per day    methocarbamol  750 mg Oral 4 times per day    ibuprofen  400 mg Oral 4 times per day     Continuous Infusions:   ketamine (KETALAR) infusion for analgosedation 0.2 mg/kg/hr (03/05/21 2054)     PRN Meds:sodium chloride flush, promethazine **OR** ondansetron, polyethylene glycol    Subjective:     No pain at rest. Moderate left chest wall pain with movement. Mild right knee pain. No numbness or tingling. Objective:     Patient Vitals for the past 8 hrs:   BP Temp Temp src Pulse Resp SpO2   03/06/21 1112 -- -- -- -- 16 97 %   03/06/21 1030 (!) 96/53 -- -- 86 -- 97 %   03/06/21 1000 94/60 -- -- 74 -- 96 %   03/06/21 0930 102/74 -- -- 89 -- 97 %   03/06/21 0900 133/83 -- -- 102 -- 98 %   03/06/21 0830 121/76 -- -- 87 -- 97 %   03/06/21 0800 (!) 114/55 98.2 °F (36.8 °C) Oral 77 16 97 %   03/06/21 0741 -- -- -- -- 16 97 %   03/06/21 0730 (!) 121/52 -- -- 81 -- 97 %   03/06/21 0700 118/78 -- -- 90 -- 97 %   03/06/21 0630 136/77 -- -- 101 -- 97 %   03/06/21 0600 116/63 -- -- 99 -- 96 %   03/06/21 0530 128/81 -- -- 96 -- 95 %   03/06/21 0500 126/61 -- -- 97 -- 97 %   03/06/21 0430 124/71 -- -- 95 -- 97 %   03/06/21 0400 103/69 98.7 °F (37.1 °C) Oral 97 -- 98 %   03/06/21 0330 (!) 115/55 -- -- 97 14 98 %       I/O last 3 completed shifts: In: 360 [P.O.:300; I.V.:60]  Out: 700 [Urine:700]  I/O this shift:  In: 312 [P.O.:300; I.V.:12]  Out: -     Radiology:  XR CHEST PORTABLE   Final Result   Tiny stable left pleural effusion since yesterday's CT given differences in   modality. Postop changes of right axillary felipe dissection and right mastectomy with   implant reconstruction. CT CHEST ABDOMEN PELVIS W CONTRAST   Final Result   Acute minimally displaced fractures of the left 4th, 5th, and 6th ribs. Minimal bibasilar atelectasis. Otherwise, clear lungs. Small left pleural   effusion. No pneumothorax. Sigmoid diverticulosis. Small hiatal hernia. Cardiomegaly. Otherwise, unremarkable CT study. No additional acute traumatic findings   within of the chest, abdomen, or pelvis. CT LUMBAR SPINE TRAUMA RECONSTRUCTION   Final Result   Moderate multilevel lumbar spondylosis, pronounced most at L4-L5 and L5-S1. Slight degenerative anterolisthesis of L4 on L5. No acute fracture. CT THORACIC SPINE TRAUMA RECONSTRUCTION   Final Result   Mild multilevel thoracic degenerative disc disease. No acute fracture or   subluxation. XR CHEST PORTABLE    (Results Pending)   XR SHOULDER RIGHT (MIN 2 VIEWS)    (Results Pending)   XR KNEE RIGHT (3 VIEWS)    (Results Pending)         PHYSICAL EXAM:   GCS: 14  4 - Opens eyes on own   6 - Follows simple motor commands  4 - Seems confused, disoriented    Pupil size:  Left 3 mm Right 3 mm  Pupil reaction: Yes  Wiggles fingers: Left Yes Right Yes  Hand grasp:   Left normal   Right normal  Wiggles toes: Left Yes    Right Yes  Plantar flexion: Left normal  Right normal      BP (!) 96/53   Pulse 86   Temp 98.2 °F (36.8 °C) (Oral)   Resp 16   Ht 5' 8\" (1.727 m)   Wt 165 lb (74.8 kg)   SpO2 97%   BMI 25.09 kg/m²   General appearance: alert, appears stated age and cooperative  Head: Normocephalic, without obvious abnormality, atraumatic  Eyes: PERRL. Conjunctiva normal  Neck: supple, symmetrical, trachea midline  Back: symmetric, no curvature. ROM normal. No CVA tenderness. Lungs: mild left superior/anterior chest wall tenderness.  No deformity or flail chest.  clear to auscultation bilaterally  Heart: regular rate and rhythm, S1, S2 normal, no murmur, click, rub or gallop  Abdomen: soft, non-tender; bowel sounds normal; no masses,  no organomegaly  Extremities: mild tenderness right knee and posterior right shoulder. Full range of motion. No swelling, deformity, ecchymosis, abrasion or laceration. Pulses: 2+ and symmetric  Skin: Skin color, texture, turgor normal. No rashes or lesions  Neurologic: Grossly normal. Pleasantly confused, likely baseline.      Spine:     Spine Tenderness ROM   Cervical 0 /10 Normal   Thoracic 0 /10 Normal   Lumbar 0 /10 Normal     Musculoskeletal    Joint Tenderness Swelling ROM   Right shoulder present and posterior; mild  absent normal   Left shoulder absent absent normal   Right elbow absent absent normal   Left elbow absent absent normal   Right wrist absent absent normal   Left wrist absent absent normal   Right hand grasp absent absent normal   Left hand grasp absent absent normal   Right hip absent absent normal   Left hip absent absent normal   Right knee Present, mild absent normal   Left knee absent absent normal   Right ankle absent absent normal   Left ankle absent absent normal   Right foot absent absent normal   Left foot absent absent normal       CONSULTS: none    PROCEDURES: none    INJURIES:      Acute L rib fractures    Assessment/Plan:     MMPT (tylenol, robaxin, motrin, gabapentin)  Ketamine gtt  Continue telemetry monitoring  Aggressive pulmonary toilet  Xray right shoulder and knee due to pain  Tolerating general diet  Monitor urine output     PROPHYLAXIS:   Stress ulcer: not indicated   VTE: lovenox    DISPOSITION:   Continue ICU

## 2021-03-06 NOTE — CARE COORDINATION
SBIRT- completed see below  Met with pt this date was very pleasant and cooperative. Pt answered all questions appropriately. Pt states she is very [place] and doesn't drink or do drugs  Denies any suicidal ideations  Screenings were negative          Alcohol Screening and Brief Intervention        Recent Labs     03/05/21  1813   ALC <10       Alcohol Pre-screening     (WOMEN ONLY) How many times in the past year have you had 4 or more drinks in a day?: None    Alcohol Screening Audit       Drug Pre-Screening   How many times in the past year have you used a recreational drug or used a prescription medication for nonmedical reasons?: None    Drug Screening DAST       Mood Pre-Screening (PHQ-2)  During the past two weeks, have you been bothered by little interest or pleasure in doing things?: No  During the past two weeks, have you been bothered by feeling down, depressed, or hopeless?: No    Mood Pre-Screening (PHQ-9)         I have interviewed Lauren Barger, [id number] regarding  Her alcohol consumption/drug use and risk for excessive use. Screenings were negative. Patient  N/A intervention at this time.     Deferred []    Completed on: 3/6/2021   STEFANY RENO

## 2021-03-06 NOTE — PLAN OF CARE
PROVIDE ADEQUATE OXYGENATION WITH ACCEPTABLE SP02/ABG'S    [x]  IDENTIFY APPROPRIATE OXYGEN THERAPY  [x]   MONITOR SP02/ABG'S AS NEEDED   [x]   PATIENT EDUCATION AS NEEDED rolling walker

## 2021-03-06 NOTE — DISCHARGE INSTR - COC
Render John on 3/10/21 at 2:25 PM EST    CASE MANAGEMENT/SOCIAL WORK SECTION    Inpatient Status Date: ***    Readmission Risk Assessment Score:  Readmission Risk              Risk of Unplanned Readmission:        6           Discharging to Facility/ Agency  ·   · :  Fax:  Lowell Soto  [address]  [address]       Phone: [phone]       Fax: [phone]        · Name:  · Address:  · Phone:  · Fax:    / signature: Electronically signed by Juan Jose Zurita RN on 3/10/21 at 3:09 PM EST    PHYSICIAN SECTION    Prognosis: Good    Condition at Discharge: Stable    Rehab Potential (if transferring to Rehab): {Prognosis:3111990438}    Recommended Labs or Other Treatments After Discharge: ***    Physician Certification: I certify the above information and transfer of Micheline Romo  is necessary for the continuing treatment of the diagnosis listed and that she requires {Admit to Appropriate Level of Care:78938} for less 30 days.      Update Admission H&P: No change in H&P    PHYSICIAN SIGNATURE:  Electronically signed by SHARRI Park CNP on 3/8/21 at 12:39 PM EST

## 2021-03-06 NOTE — ED NOTES
Daughter France Feliciano) was updated on POC for admission for close observation. Kia understands and is agreeable with POC. All questions and concerns were addressed.      Dalila Chawla, RN  03/05/21 5411

## 2021-03-06 NOTE — ED NOTES
Report given to Manasa Oh RN. All questions answered at this time.      Terry Vazquez RN  03/05/21 9519

## 2021-03-07 ENCOUNTER — APPOINTMENT (OUTPATIENT)
Dept: GENERAL RADIOLOGY | Age: 86
DRG: 184 | End: 2021-03-07
Payer: MEDICARE

## 2021-03-07 ENCOUNTER — APPOINTMENT (OUTPATIENT)
Dept: CT IMAGING | Age: 86
DRG: 184 | End: 2021-03-07
Payer: MEDICARE

## 2021-03-07 LAB
ABSOLUTE EOS #: 0.15 K/UL (ref 0–0.44)
ABSOLUTE IMMATURE GRANULOCYTE: 0.04 K/UL (ref 0–0.3)
ABSOLUTE LYMPH #: 2.29 K/UL (ref 1.1–3.7)
ABSOLUTE MONO #: 1.2 K/UL (ref 0.1–1.2)
ANION GAP SERPL CALCULATED.3IONS-SCNC: 11 MMOL/L (ref 9–17)
BASOPHILS # BLD: 1 % (ref 0–2)
BASOPHILS ABSOLUTE: 0.05 K/UL (ref 0–0.2)
BUN BLDV-MCNC: 25 MG/DL (ref 8–23)
BUN/CREAT BLD: ABNORMAL (ref 9–20)
CALCIUM SERPL-MCNC: 8.5 MG/DL (ref 8.6–10.4)
CHLORIDE BLD-SCNC: 103 MMOL/L (ref 98–107)
CO2: 20 MMOL/L (ref 20–31)
CREAT SERPL-MCNC: 0.76 MG/DL (ref 0.5–0.9)
DIFFERENTIAL TYPE: ABNORMAL
EOSINOPHILS RELATIVE PERCENT: 2 % (ref 1–4)
GFR AFRICAN AMERICAN: >60 ML/MIN
GFR NON-AFRICAN AMERICAN: >60 ML/MIN
GFR SERPL CREATININE-BSD FRML MDRD: ABNORMAL ML/MIN/{1.73_M2}
GFR SERPL CREATININE-BSD FRML MDRD: ABNORMAL ML/MIN/{1.73_M2}
GLUCOSE BLD-MCNC: 93 MG/DL (ref 70–99)
HCT VFR BLD CALC: 43.1 % (ref 36.3–47.1)
HEMOGLOBIN: 13.1 G/DL (ref 11.9–15.1)
IMMATURE GRANULOCYTES: 0 %
LYMPHOCYTES # BLD: 24 % (ref 24–43)
MCH RBC QN AUTO: 28.2 PG (ref 25.2–33.5)
MCHC RBC AUTO-ENTMCNC: 30.4 G/DL (ref 28.4–34.8)
MCV RBC AUTO: 92.9 FL (ref 82.6–102.9)
MONOCYTES # BLD: 13 % (ref 3–12)
NRBC AUTOMATED: 0 PER 100 WBC
PDW BLD-RTO: 13.3 % (ref 11.8–14.4)
PLATELET # BLD: 195 K/UL (ref 138–453)
PLATELET ESTIMATE: ABNORMAL
PMV BLD AUTO: 11.4 FL (ref 8.1–13.5)
POTASSIUM SERPL-SCNC: 3.7 MMOL/L (ref 3.7–5.3)
RBC # BLD: 4.64 M/UL (ref 3.95–5.11)
RBC # BLD: ABNORMAL 10*6/UL
SEG NEUTROPHILS: 60 % (ref 36–65)
SEGMENTED NEUTROPHILS ABSOLUTE COUNT: 5.82 K/UL (ref 1.5–8.1)
SODIUM BLD-SCNC: 134 MMOL/L (ref 135–144)
WBC # BLD: 9.6 K/UL (ref 3.5–11.3)
WBC # BLD: ABNORMAL 10*3/UL

## 2021-03-07 PROCEDURE — 6370000000 HC RX 637 (ALT 250 FOR IP): Performed by: STUDENT IN AN ORGANIZED HEALTH CARE EDUCATION/TRAINING PROGRAM

## 2021-03-07 PROCEDURE — 2580000003 HC RX 258: Performed by: STUDENT IN AN ORGANIZED HEALTH CARE EDUCATION/TRAINING PROGRAM

## 2021-03-07 PROCEDURE — 80048 BASIC METABOLIC PNL TOTAL CA: CPT

## 2021-03-07 PROCEDURE — 6360000002 HC RX W HCPCS: Performed by: STUDENT IN AN ORGANIZED HEALTH CARE EDUCATION/TRAINING PROGRAM

## 2021-03-07 PROCEDURE — 76376 3D RENDER W/INTRP POSTPROCES: CPT

## 2021-03-07 PROCEDURE — 2700000000 HC OXYGEN THERAPY PER DAY

## 2021-03-07 PROCEDURE — 94761 N-INVAS EAR/PLS OXIMETRY MLT: CPT

## 2021-03-07 PROCEDURE — 94640 AIRWAY INHALATION TREATMENT: CPT

## 2021-03-07 PROCEDURE — 36415 COLL VENOUS BLD VENIPUNCTURE: CPT

## 2021-03-07 PROCEDURE — 1200000000 HC SEMI PRIVATE

## 2021-03-07 PROCEDURE — 51701 INSERT BLADDER CATHETER: CPT

## 2021-03-07 PROCEDURE — 51798 US URINE CAPACITY MEASURE: CPT

## 2021-03-07 PROCEDURE — 71045 X-RAY EXAM CHEST 1 VIEW: CPT

## 2021-03-07 PROCEDURE — 85025 COMPLETE CBC W/AUTO DIFF WBC: CPT

## 2021-03-07 RX ORDER — ALBUTEROL SULFATE 2.5 MG/3ML
2.5 SOLUTION RESPIRATORY (INHALATION) EVERY 6 HOURS
Status: DISCONTINUED | OUTPATIENT
Start: 2021-03-07 | End: 2021-03-10 | Stop reason: HOSPADM

## 2021-03-07 RX ADMIN — GABAPENTIN 300 MG: 300 CAPSULE ORAL at 21:14

## 2021-03-07 RX ADMIN — ACETAMINOPHEN 1000 MG: 500 TABLET ORAL at 21:14

## 2021-03-07 RX ADMIN — ALBUTEROL SULFATE 2.5 MG: 2.5 SOLUTION RESPIRATORY (INHALATION) at 04:57

## 2021-03-07 RX ADMIN — IBUPROFEN 400 MG: 400 TABLET, FILM COATED ORAL at 06:25

## 2021-03-07 RX ADMIN — IBUPROFEN 400 MG: 400 TABLET, FILM COATED ORAL at 12:22

## 2021-03-07 RX ADMIN — ACETAMINOPHEN 1000 MG: 500 TABLET ORAL at 14:24

## 2021-03-07 RX ADMIN — ACETAMINOPHEN 1000 MG: 500 TABLET ORAL at 06:25

## 2021-03-07 RX ADMIN — SODIUM CHLORIDE, PRESERVATIVE FREE 10 ML: 5 INJECTION INTRAVENOUS at 09:03

## 2021-03-07 RX ADMIN — GABAPENTIN 300 MG: 300 CAPSULE ORAL at 06:25

## 2021-03-07 RX ADMIN — SODIUM CHLORIDE, PRESERVATIVE FREE 10 ML: 5 INJECTION INTRAVENOUS at 21:14

## 2021-03-07 RX ADMIN — METHOCARBAMOL TABLETS 750 MG: 500 TABLET, COATED ORAL at 17:49

## 2021-03-07 RX ADMIN — SODIUM CHLORIDE, POTASSIUM CHLORIDE, SODIUM LACTATE AND CALCIUM CHLORIDE: 600; 310; 30; 20 INJECTION, SOLUTION INTRAVENOUS at 06:30

## 2021-03-07 RX ADMIN — ENOXAPARIN SODIUM 30 MG: 30 INJECTION SUBCUTANEOUS at 09:02

## 2021-03-07 RX ADMIN — METHOCARBAMOL TABLETS 750 MG: 500 TABLET, COATED ORAL at 06:25

## 2021-03-07 RX ADMIN — DONEPEZIL HYDROCHLORIDE 10 MG: 10 TABLET, FILM COATED ORAL at 21:13

## 2021-03-07 RX ADMIN — IBUPROFEN 400 MG: 400 TABLET, FILM COATED ORAL at 17:49

## 2021-03-07 RX ADMIN — METHOCARBAMOL TABLETS 750 MG: 500 TABLET, COATED ORAL at 12:22

## 2021-03-07 RX ADMIN — MEMANTINE HYDROCHLORIDE 10 MG: 5 TABLET, FILM COATED ORAL at 21:13

## 2021-03-07 RX ADMIN — ENOXAPARIN SODIUM 30 MG: 30 INJECTION SUBCUTANEOUS at 21:15

## 2021-03-07 RX ADMIN — MEMANTINE HYDROCHLORIDE 10 MG: 5 TABLET, FILM COATED ORAL at 09:03

## 2021-03-07 RX ADMIN — POTASSIUM BICARBONATE 40 MEQ: 782 TABLET, EFFERVESCENT ORAL at 09:02

## 2021-03-07 RX ADMIN — ALBUTEROL SULFATE 2.5 MG: 2.5 SOLUTION RESPIRATORY (INHALATION) at 19:35

## 2021-03-07 RX ADMIN — GABAPENTIN 300 MG: 300 CAPSULE ORAL at 14:24

## 2021-03-07 RX ADMIN — ALBUTEROL SULFATE 2.5 MG: 2.5 SOLUTION RESPIRATORY (INHALATION) at 07:28

## 2021-03-07 RX ADMIN — ALBUTEROL SULFATE 2.5 MG: 2.5 SOLUTION RESPIRATORY (INHALATION) at 14:56

## 2021-03-07 ASSESSMENT — PAIN SCALES - GENERAL
PAINLEVEL_OUTOF10: 0
PAINLEVEL_OUTOF10: 5
PAINLEVEL_OUTOF10: 2
PAINLEVEL_OUTOF10: 0
PAINLEVEL_OUTOF10: 0

## 2021-03-07 ASSESSMENT — PAIN DESCRIPTION - LOCATION: LOCATION: RIB CAGE

## 2021-03-07 ASSESSMENT — PAIN DESCRIPTION - ONSET: ONSET: ON-GOING

## 2021-03-07 NOTE — PLAN OF CARE
Problem: Falls - Risk of:  Goal: Will remain free from falls  Description: Will remain free from falls  3/7/2021 1841 by Mariola Day  Outcome: Ongoing  3/7/2021 0931 by Vel Thompson RN  Outcome: Ongoing  Goal: Absence of physical injury  Description: Absence of physical injury  3/7/2021 1841 by Mariola Day  Outcome: Ongoing  3/7/2021 0931 by Vel Thompson RN  Outcome: Ongoing     Problem: Skin Integrity:  Goal: Will show no infection signs and symptoms  Description: Will show no infection signs and symptoms  3/7/2021 1841 by Mariola Day  Outcome: Ongoing  3/7/2021 0931 by Vel Thompson RN  Outcome: Ongoing  Goal: Absence of new skin breakdown  Description: Absence of new skin breakdown  3/7/2021 1841 by Mariola Day  Outcome: Ongoing  3/7/2021 0931 by Vel Thompson RN  Outcome: Ongoing     Problem: Pain:  Goal: Pain level will decrease  Description: Pain level will decrease  3/7/2021 1841 by Mariola Day  Outcome: Ongoing  3/7/2021 0931 by Vel Thompson RN  Outcome: Ongoing  Goal: Control of acute pain  Description: Control of acute pain  3/7/2021 1841 by Mariola Day  Outcome: Ongoing  3/7/2021 0931 by Vel Thompson RN  Outcome: Ongoing  Goal: Control of chronic pain  Description: Control of chronic pain  Outcome: Ongoing     Problem: IP BALANCE  Goal: BALANCE EDUCATION  Description: Educate patients on maintaining dynamic/static standing/sitting balance, with/without upper extremity support.   Outcome: Ongoing     Problem: IP MOBILITY  Goal: LTG - patient will ambulate household distance  Outcome: Ongoing

## 2021-03-07 NOTE — PROGRESS NOTES
ICU PROGRESS NOTE        PATIENT NAME:  [facility] RECORD NO. [id number]  DATE: 3/7/2021    PRIMARY CARE PHYSICIAN: Jc Oliva    HD: # 2    ASSESSMENT    Patient Active Problem List   Diagnosis    Hx of fracture of rib       MEDICAL DECISION MAKING AND PLAN  1. Neuro:  1. Pain control:  MMPT (tylenol, robaxin, motrin, christ); ketamine gtt; plan to DC this morning   2. DC fentanyl this AM  2. CV  1. No known cardiac hx  2. HR   3. MAP 55-90  4. Intermittent PVCs on monitor; will give additional Mg  3. Pulm  1. L rib 3-6 fxs; minimally displaced  2. Small L pleural effusion  3. HFNC 30/20  4. Encourage IS/acapella; IS 500cc this am  5. Albuterol q6  4. GI/Nutrition  1. General diet  5. Renal/lytes  1. LR 50cc/h  2. Urinary retention overnight; bladder scan q4h  3. /3.7/103/20/25/0.76  4. I/O 51C:5763/432; +329  6. Heme  1. (9.6) 14.3   2. Platelets WNL  8. Endocrine        1. Monitor glucose; add HISS if needed  7. Musculoskeletal  1. PT/OT  8. Skin   1. No lesions  9. ID/Micro  1. Afebrile  2. WBC 9.6  10. Family/dispo  1. Daughters here yesterday; they are only living children and are POA  6. Lines  3. PIV      CHECKLIST    CAM-ICU RASS: negative  RESTRAINTS: not indicated  IVF:50cc/h  NUTRITION: general diet  ANTIBIOTICS: not inidicated  GI: not indicated  DVT: lovenox  GLYCEMIC CONTROL: well controlled  HOB >45: ok  MOBILITY: encourage up out of bed    SUBJECTIVE    Mic Pals with no acute events overnight. Remains hemodynamically stable and afebrile. Slight bump in BUN and creatinine yesterday evening, fluids restart at 50 cc/h overnight. Continues to tolerate p.o. intake. Voided 100 cc early in the night. Additional 400 cc via straight cath earlier this morning. Pain well controlled at this time and only present with movement.       OBJECTIVE  VITALS: Temp: Temp: 97.8 °F (36.6 °C)Temp  Av °F (36.7 °C)  Min: 97.5 °F (36.4 °C)  Max: 98.8 °F (28.9 °C) BP Systolic (24hrs), Av , Min:73 , TUZ:744   Diastolic (30HFY), JXP:91, Min:35, Max:88   Pulse Pulse  Av.8  Min: 70  Max: 102 Resp Resp  Av.3  Min: 14  Max: 18 Pulse ox SpO2  Av.1 %  Min: 93 %  Max: 98 %    CONSTITUTIONAL: alert, oriented to self, pleasant and following commands  HEENT: head atraumatic and normocephalic. PERRL  LUNGS: bilateral chest rise is equal. Mild left anterior and posterior chest wall tenderness; no deformity or ecchymosis. CV: heart regular rate. Frequent PVCs. GI: abd soft and nontender. No rebound or guarding. MUSCULOSKELETAL: moving all extremities  NEUROLOGIC: moving all extremities. No focal numbness/weakness. SKIN: no lacerations or abrasions. LAB:  CBC:   Recent Labs     21  1813 21  0507 21  0559   WBC 15.0* 12.6* 9.6   HGB 15.1 14.3 13.1   HCT 46.2 45.3 43.1   MCV 87.8 89.3 92.9    237 195     BMP:   Recent Labs     21  1813 21  0507 21  1929   * 138 133*   K 4.3 4.0 3.7    106 100   CO2 19* 20 21   BUN 16 19 25*   CREATININE 0.57 0.70 0.97*   GLUCOSE 141* 97 160*         RADIOLOGY:  CXR: persistent small left pleural effusion.  Stable CXR      Marysol Gonzales DO  3/7/21, 6:54 AM

## 2021-03-07 NOTE — CARE COORDINATION
Case Management Initial Discharge Plan  Citlalli Mcbride,             Met with:patient and daughters to discuss discharge plans. Information verified: address, contacts, phone number, , insurance Yes    Emergency Contact/Next of Kin name & number: Prachi Liu (daughter) [phone]    PCP: Janna Cervantes  Date of last visit: last year    Insurance Provider: [facility] Medicare    Discharge Planning    Living Arrangements:  Alone   Support Systems:  Family Members    Home has 1 stories  0 stairs to climb to get into front door, stairs to climb to reach second floor  Location of bedroom/bathroom in home     Patient able to perform ADL's:Independent    Current Services (outpatient & in home)   DME equipment:   DME provider:     Receiving oral anticoagulation therapy? No    If indicated:   Physician managing anticoagulation treatment:   Where does patient obtain lab work for ATC treatment? Potential Assistance Needed:  Matt Degroot    Patient agreeable to home care: Yes  Freedom of choice provided:  no    Prior SNF/Rehab Placement and Facility:   Agreeable to SNF/Rehab: Yes  [place] of choice provided: yes     Evaluation: no    Expected Discharge date:  21    Patient expects to be discharged to: [facility]  Follow Up Appointment: Best Day/ Time: Monday AM    Transportation provider: [facility]  Transportation arrangements needed for discharge: No    Readmission Risk              Risk of Unplanned Readmission:        9             Does patient have a readmission risk score greater than 14?: No  If yes, follow-up appointment must be made within 7 days of discharge. Goals of Care:       Discharge Plan: pt currently resides at [facility] independent living. She would like to return there if they are able to provide home care or to the skilled nursing part.  Referral sent          Electronically signed by Makeda Albarran RN on 3/7/21 at 5:17 PM EST

## 2021-03-08 ENCOUNTER — APPOINTMENT (OUTPATIENT)
Dept: GENERAL RADIOLOGY | Age: 86
DRG: 184 | End: 2021-03-08
Payer: MEDICARE

## 2021-03-08 LAB
ANION GAP SERPL CALCULATED.3IONS-SCNC: 10 MMOL/L (ref 9–17)
BUN BLDV-MCNC: 19 MG/DL (ref 8–23)
BUN/CREAT BLD: ABNORMAL (ref 9–20)
CALCIUM SERPL-MCNC: 8.4 MG/DL (ref 8.6–10.4)
CHLORIDE BLD-SCNC: 108 MMOL/L (ref 98–107)
CO2: 21 MMOL/L (ref 20–31)
CREAT SERPL-MCNC: 0.66 MG/DL (ref 0.5–0.9)
GFR AFRICAN AMERICAN: >60 ML/MIN
GFR NON-AFRICAN AMERICAN: >60 ML/MIN
GFR SERPL CREATININE-BSD FRML MDRD: ABNORMAL ML/MIN/{1.73_M2}
GFR SERPL CREATININE-BSD FRML MDRD: ABNORMAL ML/MIN/{1.73_M2}
GLUCOSE BLD-MCNC: 82 MG/DL (ref 70–99)
POTASSIUM SERPL-SCNC: 3.9 MMOL/L (ref 3.7–5.3)
SODIUM BLD-SCNC: 139 MMOL/L (ref 135–144)

## 2021-03-08 PROCEDURE — 80048 BASIC METABOLIC PNL TOTAL CA: CPT

## 2021-03-08 PROCEDURE — 6370000000 HC RX 637 (ALT 250 FOR IP): Performed by: STUDENT IN AN ORGANIZED HEALTH CARE EDUCATION/TRAINING PROGRAM

## 2021-03-08 PROCEDURE — 2700000000 HC OXYGEN THERAPY PER DAY

## 2021-03-08 PROCEDURE — 1200000000 HC SEMI PRIVATE

## 2021-03-08 PROCEDURE — 94761 N-INVAS EAR/PLS OXIMETRY MLT: CPT

## 2021-03-08 PROCEDURE — 36415 COLL VENOUS BLD VENIPUNCTURE: CPT

## 2021-03-08 PROCEDURE — 6360000002 HC RX W HCPCS: Performed by: STUDENT IN AN ORGANIZED HEALTH CARE EDUCATION/TRAINING PROGRAM

## 2021-03-08 PROCEDURE — 94640 AIRWAY INHALATION TREATMENT: CPT

## 2021-03-08 PROCEDURE — 2580000003 HC RX 258: Performed by: STUDENT IN AN ORGANIZED HEALTH CARE EDUCATION/TRAINING PROGRAM

## 2021-03-08 PROCEDURE — 97116 GAIT TRAINING THERAPY: CPT

## 2021-03-08 PROCEDURE — 71045 X-RAY EXAM CHEST 1 VIEW: CPT

## 2021-03-08 PROCEDURE — 99222 1ST HOSP IP/OBS MODERATE 55: CPT | Performed by: FAMILY MEDICINE

## 2021-03-08 PROCEDURE — 97110 THERAPEUTIC EXERCISES: CPT

## 2021-03-08 RX ADMIN — MEMANTINE HYDROCHLORIDE 10 MG: 5 TABLET, FILM COATED ORAL at 08:22

## 2021-03-08 RX ADMIN — METHOCARBAMOL TABLETS 750 MG: 500 TABLET, COATED ORAL at 07:47

## 2021-03-08 RX ADMIN — ENOXAPARIN SODIUM 30 MG: 30 INJECTION SUBCUTANEOUS at 22:03

## 2021-03-08 RX ADMIN — DONEPEZIL HYDROCHLORIDE 10 MG: 10 TABLET, FILM COATED ORAL at 22:03

## 2021-03-08 RX ADMIN — ALBUTEROL SULFATE 2.5 MG: 2.5 SOLUTION RESPIRATORY (INHALATION) at 19:47

## 2021-03-08 RX ADMIN — SODIUM CHLORIDE, PRESERVATIVE FREE 10 ML: 5 INJECTION INTRAVENOUS at 22:05

## 2021-03-08 RX ADMIN — IBUPROFEN 400 MG: 400 TABLET, FILM COATED ORAL at 12:04

## 2021-03-08 RX ADMIN — GABAPENTIN 300 MG: 300 CAPSULE ORAL at 14:06

## 2021-03-08 RX ADMIN — ENOXAPARIN SODIUM 30 MG: 30 INJECTION SUBCUTANEOUS at 08:22

## 2021-03-08 RX ADMIN — ACETAMINOPHEN 1000 MG: 500 TABLET ORAL at 07:47

## 2021-03-08 RX ADMIN — MEMANTINE HYDROCHLORIDE 10 MG: 5 TABLET, FILM COATED ORAL at 22:03

## 2021-03-08 RX ADMIN — ALBUTEROL SULFATE 2.5 MG: 2.5 SOLUTION RESPIRATORY (INHALATION) at 09:32

## 2021-03-08 RX ADMIN — IBUPROFEN 400 MG: 400 TABLET, FILM COATED ORAL at 00:26

## 2021-03-08 RX ADMIN — GABAPENTIN 300 MG: 300 CAPSULE ORAL at 07:47

## 2021-03-08 RX ADMIN — METHOCARBAMOL TABLETS 750 MG: 500 TABLET, COATED ORAL at 17:39

## 2021-03-08 RX ADMIN — GABAPENTIN 300 MG: 300 CAPSULE ORAL at 22:03

## 2021-03-08 RX ADMIN — ACETAMINOPHEN 1000 MG: 500 TABLET ORAL at 22:03

## 2021-03-08 RX ADMIN — IBUPROFEN 400 MG: 400 TABLET, FILM COATED ORAL at 07:48

## 2021-03-08 RX ADMIN — METHOCARBAMOL TABLETS 750 MG: 500 TABLET, COATED ORAL at 12:04

## 2021-03-08 RX ADMIN — METHOCARBAMOL TABLETS 750 MG: 500 TABLET, COATED ORAL at 00:26

## 2021-03-08 RX ADMIN — SODIUM CHLORIDE, PRESERVATIVE FREE 10 ML: 5 INJECTION INTRAVENOUS at 08:23

## 2021-03-08 RX ADMIN — ACETAMINOPHEN 1000 MG: 500 TABLET ORAL at 14:06

## 2021-03-08 RX ADMIN — IBUPROFEN 400 MG: 400 TABLET, FILM COATED ORAL at 17:39

## 2021-03-08 ASSESSMENT — PAIN SCALES - GENERAL
PAINLEVEL_OUTOF10: 4
PAINLEVEL_OUTOF10: 4
PAINLEVEL_OUTOF10: 8

## 2021-03-08 NOTE — PROGRESS NOTES
Physical Therapy  Facility/Department: [facility]  Daily Treatment Note  NAME: Karlee Clayton  : 1935  MRN: [id number]    Date of Service: 3/8/2021    Discharge Recommendations:  Patient would benefit from continued therapy after discharge   PT Equipment Recommendations  Equipment Needed: Yes  Walker: Rolling    Assessment   Body structures, Functions, Activity limitations: Decreased functional mobility ; Decreased endurance;Decreased strength;Decreased coordination  Assessment: Pt amb ~120ft w/ RW and Jignesh, increased assistance w/ turns, poor safety awareness. MinAw/ bed mob and transfers. Recommend 24 hour assistance in her current condition to prevent another fall and injury. Pt would benefit from continued PT to address balance and strength deficits. Prognosis: Good  Decision Making: Medium Complexity  PT Education: Goals;Transfer Training;PT Role;Functional Mobility Training;General Safety;Plan of Care;Gait Training  Patient Education: educated wife on how to STS pt w/o gait belt. REQUIRES PT FOLLOW UP: Yes  Activity Tolerance  Activity Tolerance: Patient limited by cognitive status; Patient limited by endurance; Patient limited by fatigue     Patient Diagnosis(es): The encounter diagnosis was Closed fracture of multiple ribs of left side, initial encounter. has a past medical history of Breast cancer ([facility] [address] Dupuytren contracture, Hyperlipidemia, Malignant neoplasm of breast (female) ([facility] [address] Rib fractures, Short-term memory loss, Vertigo, and Vitamin D deficiency. has a past surgical history that includes Mastectomy (Right, ); Tonsillectomy (194); Stapedes surgery (Bilateral); Foot surgery (); Dilation and curettage of uterus (); Hand surgery (5099/); Breast biopsy (Right, 1992); Breast reconstruction (Right, ); Cataract removal; Breast biopsy (Left, );  Colonoscopy; and Breast Implant Removal (08/2009). Restrictions  Restrictions/Precautions  Restrictions/Precautions: Fall Risk(high flow 02)  Position Activity Restriction  Other position/activity restrictions: Up with assist. Left rib fractures 4, 5, 6  Subjective   General  Response To Previous Treatment: Patient with no complaints from previous session. Family / Caregiver Present: Yes  Subjective  Subjective: Pt and RN agreed to PT, pt supine in bed upon arrival.  Pain Screening  Patient Currently in Pain: Denies  Vital Signs  Patient Currently in Pain: Denies       Orientation  Orientation  Overall Orientation Status: Impaired  Cognition      Objective   Bed mobility  Supine to Sit: Minimal assistance(trunk progression)  Scooting: Minimal assistance  Transfers  Sit to Stand: Minimal Assistance  Stand to sit: Minimal Assistance  Bed to Chair: Contact guard assistance  Ambulation  Ambulation?: Yes  More Ambulation?: No  Ambulation 1  Surface: level tile  Device: Rolling Walker  Assistance: Minimal assistance  Gait Deviations: Deviated path;Decreased step height;Decreased arm swing  Distance: ~120ft  Comments: pt veers to the R and requires assistance to help correct it, Jignesh for turning RW, decreased safety awareness, vc needed to avoid objects in pathway. Limited by BLE weakness and endurance. Stairs/Curb  Stairs?: No     Balance  Sitting - Static: Fair  Sitting - Dynamic: Fair  Standing - Static: Fair;-  Standing - Dynamic: Poor  Comments: Standing balance assessed w/ RW, slight posterior lean during standing. Exercises  Shoulder Active Range of Motion: RUE shoulder flexion, elbow flexion, and punches x10, LUE elbow flexion  Comments: Seated LE exercise program: Long Arc Quads, hip abduction/adduction, heel/toe raises, and marches. Reps: x10     Goals  Short term goals  Time Frame for Short term goals: 14 visits  Short term goal 1: Sit to/from stand with supervision. Short term goal 2: Ambulate 150' with walker with SBA.   Short term goal 3:

## 2021-03-08 NOTE — CONSULTS
Palliative Care Inpatient Consult    [id number]:  [id number]  AGE: 80 y.o. GENDER: female  : 1935  TODAY'S DATE:  3/8/2021    Reasons for Consultation:    Symptom and/or pain management  Provision of information regarding PC and/or hospice philosophies  Complex, time-intensive communication and interdisciplinary psychosocial support  Clarification of goals of care and/or assistance with difficult decision-making  Guidance in regards to resources and transition(s)    Members of PC team contributing to this consultation are :  Dr. Lizzie Wick palliative care attending  History of Present Illness     The patient is a 80 y.o. Non-/non  female who presents with Rib Pain      Referred to Palliative Care by   [x] Physician   [] Nursing  [] Family Request   [] Other:       She was admitted to the trauma service for Hx of fracture of rib [Z87.81]  Hx of fracture of rib [Z87.81]. Her hospital course has been associated with <principal problem not specified>. The patient has a complicated medical history and has been hospitalized since 3/5/2021  5:10 PM.  Patient was transferred from [facility] where she was brought in after a fall, as per records patient does not recall the fall and also did not know whether she was inside or outside when she fell but did recall that she could not get up, patient was found by someone who brought her meals, down time unknown, imagings at [facility] showed negative C/T/L-spine and CT head also negative. CT chest 3/5/2021 showed fractures of left fourth/fifth/sixth ribs minimally displaced. Palliative care consulted for goals of care, CODE STATUS discussion.     Active Hospital Problems    Diagnosis Date Noted    Hx of fracture of rib [Z87.81] 2021       PAST MEDICAL HISTORY      Diagnosis Date    Breast cancer ([facility] [address]     Dupuytren contracture     Hyperlipidemia     Malignant neoplasm of breast (female) ([address]     Right    Rib fractures 03/05/2021    minimally displaced fractures of the left 4th, 5th, and 6th ribs.  Short-term memory loss     Vertigo     Vitamin D deficiency        PAST SURGICAL HISTORY  Past Surgical History:   Procedure Laterality Date    BREAST BIOPSY Right 1992    BREAST BIOPSY Left 2009    Benign    BREAST IMPLANT REMOVAL  08/2009    removeal d/t collapse of implant. Expanders put in place.  BREAST RECONSTRUCTION Right 1993    CATARACT REMOVAL      COLONOSCOPY      DILATION AND CURETTAGE OF UTERUS  1984    FOOT SURGERY  1983    HAND SURGERY  1990/1999    x2    MASTECTOMY Right 1992    STAPEDES SURGERY Bilateral     x4 - 1973; 1975; 1984; [address] HISTORY  Social History     Tobacco Use    Smoking status: Never Smoker    Smokeless tobacco: Never Used   Substance Use Topics    Alcohol use: Not Currently    Drug use: Not Currently       ALLERGIES  Allergies   Allergen Reactions    Corticosteroids Anxiety     elevated BP, emotional         MEDICATIONS  Current Medications    albuterol  2.5 mg Nebulization Q6H    donepezil  10 mg Oral Nightly    memantine  10 mg Oral BID    sodium chloride flush  10 mL Intravenous 2 times per day    acetaminophen  1,000 mg Oral 3 times per day    enoxaparin  30 mg Subcutaneous BID    gabapentin  300 mg Oral 3 times per day    methocarbamol  750 mg Oral 4 times per day    ibuprofen  400 mg Oral 4 times per day     sodium chloride flush, polyethylene glycol  IV Drips/Infusions    Home Medications  No current facility-administered medications on file prior to encounter.       Current Outpatient Medications on File Prior to Encounter   Medication Sig Dispense Refill    memantine ER (NAMENDA XR) 28 MG CP24 extended release capsule Take 28 mg by mouth daily      donepezil (ARICEPT) 10 MG tablet Take 10 mg by mouth nightly         Data         BP (!) 147/78   Pulse 99   Temp 97.5 °F (36.4 °C) (Oral)   Resp 16   Ht 5' 8\" (1.727 m)   Wt 145 lb 11.6 oz (66.1 kg)   SpO2 95%   BMI 22.16 kg/m²     Wt Readings from Last 3 Encounters:   03/07/21 145 lb 11.6 oz (66.1 kg)        Code Status: Full Code     ADVANCED CARE PLANNING:  Patient has capacity for medical decisions: yes  Health Care Power of : no  Living Will: no     Personal, Social, and Family History  Marital Status:   Living situation: Independent living  Importance of robel/Temple/spiritual beliefs: [] Very [x] Somewhat [] Not   Psychological Distress: mild  Does patient understand diagnosis/treatment? yes  Does caregiver understand diagnosis/treatment? not asked    Past Medical History:   Diagnosis Date    Breast cancer ([facility] [address]     Dupuytren contracture     Hyperlipidemia     Malignant neoplasm of breast (female) ([facility] [address] 1992    Right    Rib fractures 03/05/2021    minimally displaced fractures of the left 4th, 5th, and 6th ribs.  Short-term memory loss     Vertigo     Vitamin D deficiency          History reviewed. No pertinent family history.     Social History     Tobacco Use    Smoking status: Never Smoker    Smokeless tobacco: Never Used   Substance Use Topics    Alcohol use: Not Currently    Drug use: Not Currently           Assessment        REVIEW OF SYSTEMS  Constitutional: no fever, no chills or weight loss, + fatigue and tiredness  Eyes: no eye pain or blurred vision  ENT: no hearing loss, congestion, or difficulty swallowing   Respiratory: no wheezing, chest tightness, or shortness of breath   Cardiovascular: no chest pain or pressure, no palpitations, no diaphoresis   Gastrointestinal: no nausea, vomiting,abdominal pain, diarrhea or constipation, no melena   Genitourinary: no dysuria, frequency, hematuria, or nocturia   Musculoskeletal: no myalgias or arthralgias, no back pain   Skin: no rashes or sores   Neurological: no focal weakness, numbness, tingling or headache, no seizures    PHYSICAL ASSESSMENT:  Constitutional: Alert and oriented to person, place, and time. Head: Normocephalic and atraumatic. Eyes: EOM are normal. Pupils are equal, round. Neck: Normal range of motion. Neck supple. No tracheal deviation present. Cardiovascular: Normal rate and regular rhythm, S1, S2, no murmur. Pulmonary/Chest: Effort normal and breath sounds normal. No rales or wheezes. Abdomen: Soft. No tenderness, not distended, no ascites, no organomegaly. Musculoskeletal: Normal range of motion. No edema lower ext. Neurological: Alert, awake, oriented, following commands  Skin: Normal turgor, no bleeding, no bruising. Palliative Performance Scale:  ___60%  Ambulation reduced; Significant disease; Can't do hobbies/housework; intake normal or reduced; occasional assist; LOC full/confusion  ___50%  Mainly sit/lie; Extensive disease; Can't do any work; Considerable assist; intake normal or reduced; LOC full/confusion  __x_40%  Mainly in bed; Extensive disease; Mainly assist; intake normal or reduced; LOC full/confusion   ___30%  Bed Bound; Extensive disease; Total care; intake reduced; LOC full/confusion  ___20%  Bed Bound; Extensive disease; Total care; intake minimal; Drowsy/coma  ___10%  Bed Bound; Extensive disease;  Total care; Mouth care only; Drowsy/coma  ___0       Death      Plan      Palliative Interaction:  The patient was seen today along with medical student Ellen Powers  I met with the patient and explained her the role of palliative care and told her that palliative care is an extra layer of support and strength for her and family    I discussed patient's current medical conditions with her and patient stated that she fell and got hurt in her leg as well as got broken ribs    Patient told that she is  and has 2 living daughters named Veleta Pallas and Sharon Bowie  Patient said that Veleta Pallas lives near her and Sharon Bowie lives near [facility]  Patient told that she remembers her  a lot but is happy with her life although at times she does get lonely    Patient said that she lived in independent living but now she will be seeing some assistance and will be going to skilled facility for rehab    I explained the significance of POA paperwork for health and patient stated that she does not have them but would want her both daughters Timmie Duane and Iliana Loja to be equally involved in the decision making for her health if need arises and I respected her wishes    I explained the different types of codes to the patient and patient stated that she wants to think before making any changes and I told the patient that these can be addressed again and she can discuss these with her daughters also    I offered comfort and emotional support to the patient        [address] (ACP) Physician/NP/PA (Provider) Ashanti Neville      Date of ACP Conversation: 3/5/2021    Conversation Conducted with:   Patient with Decision Making [place]:    Current Designated Health Care Decision Maker:      Patient's legal spokesperson are patient's daughters Brett Murphy ([phone]) and Oren Zapatao ([phone]) as per [facility] laws    Care Preferences:    Hospitalization: \"If your health worsens and it becomes clear that your chance of recovery is unlikely, what would your preference be regarding hospitalization? \"  Currently hospitalized    Ventilation: \"If you were in your present state of health and suddenly became very ill and were unable to breathe on your own, what would your preference be about the use of a ventilator (breathing machine) if it was available to you? \"    Full code    \"If your health worsens and it becomes clear that your chance of recovery is unlikely, what would your preference be about the use of a ventilator (breathing machine) if it was available to you? \"   Full code    Resuscitation:  \"CPR works best to restart the heart when there is a sudden event, like a heart attack, in someone who is otherwise healthy. Unfortunately, CPR does not typically restart the heart for people who have serious health conditions or who are very sick. \"    \"In the event your heart stopped as a result of an underlying serious health condition, would you want attempts to be made to restart your heart (answer \"yes\" for attempt to resuscitate) or would you prefer a natural death (answer \"no\" for do not attempt to resuscitate)? \"   Full code    Conversation Outcomes / Follow-Up Plan:     Patient's legal spokesperson are patient's 2 daughters Stephania Arellano ([phone]) and   Zeina Riley ([phone]) as per [facility] [place]    Jordan Boyle MD         Education/support to staff  Education/support to family  Education/support to patient  Discharge planning/helping to coordinate care  Communications with primary service  Caregiver support/education  Code status clarified: Full Code  Code status clarified: [facility]  Code status clarified: [facility]  Other major issues     Principle Problem/Diagnosis:  Fall    Additional Assessments:   Active Problems:    Hx of fracture of rib  Fall    1- Symptom management/ pain control     Pain Assessment:  Pain is controlled with current analgesics. Medication(s) being used: acetaminophen. Anxiety:  fatigue                          Dyspnea:  none                          Fatigue:  Tiredness and weakness    We feel the patient symptoms are being controlled. her current regimen is reviewed by myself and discussed with the staff.      We will follow-up with CODE STATUS discussion again with the patient    2- Goals of care evaluation   The patient goals of care are improve or maintain function/quality of life, accomplish a particular personal goal, spiritual needs, strengthening relationships, preserve independence/autonomy/control and support for family/caregiver   Goals of care discussed with:    [x] Patient independently    [] Patient and Family    [] Family or Healthcare DPOA independently    [] Unable to discuss with patient, family/DPOA not present    3- Code Status  Full Code    4- Other recommendations   - We will continue to provide comfort and support to the patient and the family  Please call with any palliative questions or concerns. Palliative Care Team is available via perfect serve or via phone. Palliative Care will continue to follow Ms. Krueger's care as needed. Thank you for allowing Palliative Care to participate in the care of Ms. Krueger . This note has been dictated by dragon, typing errors may be a possibility.     The total time I spent in seeing the patient, discussing goals of care, advanced directives, code status, greater than 50% time in counseling and other major issues was more than 60 minutes      Electronically signed by   Latha Wright MD  Palliative Care Team  on 3/8/2021 at 11:03 AM    Palliative care office: [phone]

## 2021-03-08 NOTE — PROGRESS NOTES
Patient reluctant to cough due to pain. Obtained a pillow and tought patient how to splint the rib cage and use short bursts of coughing to promote airway clearance. Performed well but memory will require re-education each treatment,.

## 2021-03-08 NOTE — CARE COORDINATION
Transition planning  Left message for [facility] - to call me regarding admission of pt to [phone]  11:00 spoke with Daughter Catie [phone] She relates she has spoke with Christoph White at [facility] about [facility] placement from [facility]. Spoke with Christoph White at [phone] She relates that they will need to accept  pt She may be able to  streamline process . I faxed required information to fax [phone] ( same as Suzi ) [facility] is referred to as [facility]. 3:30 Stephani calls from [facility]) They can accept her. They will start precert . Will need Covid test and signed Rambo Quiver form. . Informed daughters they would like to transport pt to [facility] if possible. daughters would like a call if we get a time for discharge [phone] .  They plan on returning around 2 pm.    Blue Packet in slot above computer

## 2021-03-08 NOTE — PROGRESS NOTES
PROGRESS NOTE        PATIENT NAME: Karlee Clayton  MEDICAL RECORD NO. [id number]  DATE: 3/8/2021    PRIMARY CARE PHYSICIAN: Leanord Fleischer    HD: # 3    ASSESSMENT    Patient Active Problem List   Diagnosis    Hx of fracture of rib       MEDICAL DECISION MAKING AND PLAN  1. Neuro:  1. Pain control:  MMPT (tylenol, robaxin, motrin, christ)  2. Baseline dementia, pleasantly confused this AM  2. CV  1. No known cardiac hx  2. HR   3. Pulm  1. L rib 3-6 fxs; minimally displaced  2. Small L pleural effusion  3. HFNC 30/20  4. Encourage IS/acapella; IS 900cc this am  5. Albuterol q6  4. GI/Nutrition  1. General diet, tolerating  5. Renal/lytes  1. Urinary retention improved  2. UOP: many unmeasured overnight  6. Heme  1. (9.6) 14.3   2. Platelets WNL  8. Endocrine        1. Monitor glucose; add HISS if needed  7. Musculoskeletal  1. PT/OT  8. Skin   1. No lesions  9. ID/Micro  1. Afebrile  2. WBC 9.6 yesterday  10. Family/dispo  1. Daughters are only living children and are POA  6. Lines  3. PIV      Medically cleared for d/c. Dispo pending placement. CHECKLIST    CAM-ICU RASS: negative  RESTRAINTS: not indicated  IVF:50cc/h  NUTRITION: general diet  ANTIBIOTICS: not inidicated  GI: not indicated  DVT: lovenox  GLYCEMIC CONTROL: well controlled  HOB >45: ok  MOBILITY: encourage up out of bed    SUBJECTIVE    Patient seen and examined at bedside, no overnight events. Patient is pleasantly confused this morning. Patient reports she is struggling to use the bedpan this morning, RN overnight reports multiple voids without difficulty when assisted to the bedside commode. Tolerating general diet. Denies pain. Denies CP, S OB, C/F, N/V. Afebrile, T-max 98.2.   I-S 900      OBJECTIVE  VITALS: Temp: Temp: 98 °F (36.7 °C)Temp  Av.8 °F (36.6 °C)  Min: 97.1 °F (36.2 °C)  Max: 98.2 °F (62.1 °C) BP Systolic (55UEV), IAQ:495 , Min:84 , NYX:792   Diastolic (04NLI), MAP:50, Min:41, Max:82   Pulse Pulse  Av.4  Min: 82  Max: 106 Resp Resp  Av  Min: 14  Max: 18 Pulse ox SpO2  Av.5 %  Min: 93 %  Max: 99 %    CONSTITUTIONAL: alert, oriented to self, pleasantly confused, and following commands  HEENT: head atraumatic and normocephalic. PERRL  LUNGS: bilateral chest rise is equal. Mild left anterior and posterior chest wall tenderness; no deformity or ecchymosis. CV: heart regular rate   GI: abd soft and nontender. No rebound or guarding. MUSCULOSKELETAL: moving all extremities  NEUROLOGIC: moving all extremities. No focal numbness/weakness. SKIN: no lacerations or abrasions. LAB:  CBC:   Recent Labs     21  1813 21  0507 21  0559   WBC 15.0* 12.6* 9.6   HGB 15.1 14.3 13.1   HCT 46.2 45.3 43.1   MCV 87.8 89.3 92.9    237 195     BMP:   Recent Labs     21  1929 21  0559 21  0524   * 134* 139   K 3.7 3.7 3.9    103 108*   CO2 21 20 21   BUN 25* 25* 19   CREATININE 0.97* 0.76 0.66   GLUCOSE 160* 93 82         RADIOLOGY:  Xr Shoulder Right (min 2 Views)    Result Date: 3/6/2021  Right shoulder: 1. Mild degenerative changes as detailed above. 2. Right basilar atelectasis and/or infiltrate. 3. No acute fracture or dislocation. Right knee: 1. Tricompartmental osteoarthrosis. Moderate narrowing of the lateral compartment. Small joint effusion. Diffuse osteopenia. 2. Posterior joint recess loose bodies measuring up to 5 mm. 3. No acute fracture or dislocation. Xr Knee Right (3 Views)    Result Date: 3/6/2021  Right shoulder: 1. Mild degenerative changes as detailed above. 2. Right basilar atelectasis and/or infiltrate. 3. No acute fracture or dislocation. Right knee: 1. Tricompartmental osteoarthrosis. Moderate narrowing of the lateral compartment. Small joint effusion. Diffuse osteopenia. 2. Posterior joint recess loose bodies measuring up to 5 mm. 3. No acute fracture or dislocation. Xr Chest Portable    Result Date: 3/7/2021  1.  Airspace disease involving the bilateral mid and lower lung zones and small left-sided pleural effusion. Follow-up is recommended to document resolution. 2. Mild emphysema. 3. Multiple displaced left-sided rib fracture deformities again noted. No obvious pneumothorax or mediastinal shift. 4. Mild cardiomegaly.          Phill Allen, DO  3/7/21, 8:11 AM

## 2021-03-09 PROCEDURE — 97535 SELF CARE MNGMENT TRAINING: CPT

## 2021-03-09 PROCEDURE — 6370000000 HC RX 637 (ALT 250 FOR IP): Performed by: STUDENT IN AN ORGANIZED HEALTH CARE EDUCATION/TRAINING PROGRAM

## 2021-03-09 PROCEDURE — 6360000002 HC RX W HCPCS: Performed by: STUDENT IN AN ORGANIZED HEALTH CARE EDUCATION/TRAINING PROGRAM

## 2021-03-09 PROCEDURE — 1200000000 HC SEMI PRIVATE

## 2021-03-09 PROCEDURE — 6370000000 HC RX 637 (ALT 250 FOR IP): Performed by: NURSE PRACTITIONER

## 2021-03-09 PROCEDURE — 94761 N-INVAS EAR/PLS OXIMETRY MLT: CPT

## 2021-03-09 PROCEDURE — 97116 GAIT TRAINING THERAPY: CPT

## 2021-03-09 PROCEDURE — 94640 AIRWAY INHALATION TREATMENT: CPT

## 2021-03-09 RX ORDER — TRAZODONE HYDROCHLORIDE 50 MG/1
50 TABLET ORAL NIGHTLY
Status: DISCONTINUED | OUTPATIENT
Start: 2021-03-09 | End: 2021-03-10

## 2021-03-09 RX ORDER — QUETIAPINE FUMARATE 25 MG/1
25 TABLET, FILM COATED ORAL 2 TIMES DAILY
Status: DISCONTINUED | OUTPATIENT
Start: 2021-03-09 | End: 2021-03-10

## 2021-03-09 RX ORDER — QUETIAPINE FUMARATE 25 MG/1
25 TABLET, FILM COATED ORAL 2 TIMES DAILY
Status: DISCONTINUED | OUTPATIENT
Start: 2021-03-09 | End: 2021-03-09

## 2021-03-09 RX ADMIN — DONEPEZIL HYDROCHLORIDE 10 MG: 10 TABLET, FILM COATED ORAL at 20:45

## 2021-03-09 RX ADMIN — METHOCARBAMOL TABLETS 750 MG: 500 TABLET, COATED ORAL at 00:06

## 2021-03-09 RX ADMIN — QUETIAPINE FUMARATE 25 MG: 25 TABLET ORAL at 16:17

## 2021-03-09 RX ADMIN — ALBUTEROL SULFATE 2.5 MG: 2.5 SOLUTION RESPIRATORY (INHALATION) at 01:30

## 2021-03-09 RX ADMIN — ACETAMINOPHEN 1000 MG: 500 TABLET ORAL at 20:45

## 2021-03-09 RX ADMIN — MEMANTINE HYDROCHLORIDE 10 MG: 5 TABLET, FILM COATED ORAL at 20:45

## 2021-03-09 RX ADMIN — TRAZODONE HYDROCHLORIDE 50 MG: 50 TABLET ORAL at 20:45

## 2021-03-09 RX ADMIN — IBUPROFEN 400 MG: 400 TABLET, FILM COATED ORAL at 18:28

## 2021-03-09 RX ADMIN — IBUPROFEN 400 MG: 400 TABLET, FILM COATED ORAL at 00:06

## 2021-03-09 ASSESSMENT — PAIN DESCRIPTION - LOCATION: LOCATION: RIB CAGE

## 2021-03-09 ASSESSMENT — PAIN SCALES - GENERAL
PAINLEVEL_OUTOF10: 6
PAINLEVEL_OUTOF10: 0
PAINLEVEL_OUTOF10: 0

## 2021-03-09 ASSESSMENT — PAIN DESCRIPTION - ORIENTATION: ORIENTATION: LEFT

## 2021-03-09 ASSESSMENT — PAIN DESCRIPTION - PAIN TYPE: TYPE: ACUTE PAIN

## 2021-03-09 NOTE — FLOWSHEET NOTE
Assessment.  responded to a spiritual care consult. Krish Chan welcomed the . She shared that her  was a united Yarsani  for years. She said that she is doing well. Intervention.  used active listening and pastoral presence. Outcome. Care. Connection. Comfort. Pt engaged in conversation and thanked the  for visiting. Plan. Chaplains will remain available to offer spiritual and emotional support as needed. 03/09/21 1402   Encounter Summary   Services provided to: Patient   Referral/Consult From: Nurse   Continue Visiting   (3/9/2021)   Complexity of Encounter Moderate   Length of Encounter 30 minutes   Spiritual Assessment Completed Yes   Spiritual/Church   Type Spiritual support   Assessment Calm; Approachable   Intervention Sustaining presence/ Ministry of presence; Active listening   Outcome Connection/belonging;Comfort;Expressed gratitude

## 2021-03-09 NOTE — PROGRESS NOTES
Occupational Therapy  Facility/Department: [facility] ONC/MED SURG  Daily Treatment Note  NAME: Kandice Palomino  : 1935  MRN: [id number]    Date of Service: 3/9/2021    Discharge Recommendations:  Patient would benefit from continued therapy after discharge. Pt displays decreased safety awareness, decreased endurance, decreased balance. Pt not safe to return to prior living arrangements at this time  Assessment   Performance deficits / Impairments: Decreased functional mobility ; Decreased safe awareness;Decreased endurance;Decreased balance;Decreased high-level IADLs;Decreased ADL status  Prognosis: Good  OT Education: Plan of Care;OT Role;Transfer Training  Patient Education: OT role, POC, transfer training, safety awareness-Fair return  REQUIRES OT FOLLOW UP: Yes  Activity Tolerance  Activity Tolerance: Patient Tolerated treatment well  Safety Devices  Safety Devices in place: Yes  Type of devices: Left in chair;Nurse notified;Call light within reach;Gait belt  Restraints  Initially in place: No     Patient Diagnosis(es): The encounter diagnosis was Closed fracture of multiple ribs of left side, initial encounter. has a past medical history of Breast cancer ([facility] [address] Dupuytren contracture, Hyperlipidemia, Malignant neoplasm of breast (female) ([facility] [address] Rib fractures, Short-term memory loss, Vertigo, and Vitamin D deficiency. has a past surgical history that includes Mastectomy (Right, ); Tonsillectomy (); Stapedes surgery (Bilateral); Foot surgery (); Dilation and curettage of uterus (); Hand surgery (9875/0065); Breast biopsy (Right, ); Breast reconstruction (Right, ); Cataract removal; Breast biopsy (Left, ); Colonoscopy; and Breast Implant Removal (2009). Restrictions  Restrictions/Precautions  Restrictions/Precautions: Fall Risk  Position Activity Restriction  Other position/activity restrictions:  Up with assist. Left rib fractures 4, 5, 6  Subjective   General  Chart Reviewed: Yes  Patient assessed for rehabilitation services?: Yes  General Comment  Comments: RN ok'd for OT. Pt pleasantly confused and tolerated well  Pain Assessment  Pain Assessment: (Pt stated pain in ribs, but did not give a rating)  Pain Type: Acute pain  Pain Location: Rib cage  Pain Orientation: Left  Pain Descriptors: Aching; Sore  Non-Pharmaceutical Pain Intervention(s): Distraction; Emotional support; Therapeutic presence  Vital Signs  Patient Currently in Pain: Yes   Orientation  Orientation  Overall Orientation Status: Impaired  Orientation Level: Disoriented to place; Disoriented to time;Oriented to person;Oriented to situation  Objective    ADL  LE Dressing: Stand by assistance;Verbal cueing;Setup(Don/doff socks sitting EOB displaying good sitting balance)  Additional Comments: Further ADLs not completed, sitter stated pt showered and completed dressing/grooming tasks prior to tx session, pt looking for suitcase for dressing task, but sitter states pt does not have one in room  Balance  Sitting Balance: Supervision(Tolerated approx 15 min sitting EOB, pt sitting EOB upon arrival and departure)  Standing Balance: Contact guard assistance  Standing Balance  Time: ~5 min  Comment: No AD utilized. Pt impulsive during sit to stand transfers  Functional Mobility  Functional - Mobility Device: No device  Activity: (Func mobility around room)  Assist Level: Contact guard assistance  Functional Mobility Comments: Func mobility around room, no AD utilized, CGA  Bed mobility  Scooting: Stand by assistance  Comment: Pt sitting EOB at arrival and departure  Transfers  Sit to stand: Contact guard assistance  Stand to sit: Contact guard assistance  Transfer Comments: Pt impulsive for sit <> transfers this date, req v/c for safety awareness  Cognition  Overall Cognitive Status: Exceptions  Arousal/Alertness: Appropriate responses to stimuli  Following Commands: Follows one step commands with increased time; Follows one step commands with repetition  Attention Span: Attends with cues to redirect  Memory: Decreased recall of recent events;Decreased short term memory;Decreased recall of biographical Information  Safety Judgement: Decreased awareness of need for assistance;Decreased awareness of need for safety  Problem Solving: Assistance required to generate solutions;Assistance required to identify errors made;Assistance required to correct errors made;Decreased awareness of errors;Assistance required to implement solutions  Insights: Decreased awareness of deficits  Initiation: Requires cues for some  Sequencing: Requires cues for some  Plan   Plan  Times per week: 1-3x/wk  Goals  Short term goals  Time Frame for Short term goals: pt will, by discharge  Short term goal 1: dem UB ADL ind/mod I with AE PRN  Short term goal 2: dem LB ADL/toileting with supervision and AE PRN  Short term goal 3: complete functional mobility/transfer with SBA and LRD PRN  Short term goal 4: follow 75% of one-step commands during functional activity  Short term goal 5: attend to functional activity with 2 or less v/c's for attention  Short term goal 6: engage in functional activity requiring dynamic standing for 10+ minutes with SBA and LRD PRN     Therapy Time   Individual Concurrent Group Co-treatment   Time In 0941         Time Out 1004         Minutes 23         Timed Code Treatment Minutes: 23 Minutes   Pt sitting EOB upon arrival, agitated and confused but became pleasant during tx session, pt retired to sitting EOB this date. Call light within reach, sitter present throughout tx. Pt confused throughout tx session, stating pt does not remember how they got here.  Pt reminded they were in the hospital but did not understand     SENAIT Mckinnon/TIFFANY

## 2021-03-09 NOTE — CARE COORDINATION
Transition planning  Pts daughter Froy Salazar calls decides to not transport pt when going to SNF will call if precert is completed . They will be up here later today. Informed by nursing sitter put on last night. Spoke with Admissions and they have approval until 11 will check back with us [email or web address] for update  Spoke with daughter Froy Salazar to update her.

## 2021-03-09 NOTE — PROGRESS NOTES
PROGRESS NOTE    PATIENT NAME:  [facility] RECORD NO. [id number]  DATE: 3/9/2021  SURGEON: Earl Mo  PRIMARY CARE PHYSICIAN: Onel Mcdermott    HD: # 4    ASSESSMENT    Patient Active Problem List   Diagnosis    Hx of fracture of rib       MEDICAL DECISION MAKING AND PLAN    Plan:  1. Neuro- Analgesia multimodal pain therapy, dementia meds resumed   2. CV- continue to monitor BP/HR  3. Resp- HF at night, continue to encourage incentive spirometry, acapella, and deep breathing every hour while awake per RT and RN  4. GI- Diet gen, continue bowel regimen,  monitor for bowel function,   5. Renal- jan UOP, accurate I/Os, replace electrolytes PRN, KVO  6. Msk- continue to encourage ambulation/activity, PT/OT eval and Tx   7. ID- jan for fevers; Ppx- lovenox DVT ppx, endo- jan BG  8. Dispo planning:  Acceptance to facility, will need to determine bed sitter status of accepting facility if sitter needs to be off for 24hrs etc    SUBJECTIVE    Connie TIFFANY Krueger is unchanged this am from yesterday. Per nursing staff patient was disoriented last night and became verbally aggressive and uncooperative for the medical team.  Patient was refusing to yield to the directions from the medical personnel and bedside sitter was ordered for safety. Patient is pleasant this a.m. and somewhat aware of her behavior the night before and apologizing for her behavior.      OBJECTIVE  VITALS: Temp: Temp: 98.1 °F (36.7 °C)Temp  Av.9 °F (36.6 °C)  Min: 97.5 °F (36.4 °C)  Max: 98.1 °F (99.4 °C) BP Systolic (74IRH), ZTL:176 , Min:131 , ZFJ:753   Diastolic (48OPM), CMF:20, Min:78, Max:88   Pulse Pulse  Av  Min: 89  Max: 100 Resp Resp  Av  Min: 16  Max: 16 Pulse ox SpO2  Av.7 %  Min: 95 %  Max: 98 %  GENERAL: alert, no distress  NEURO: No focal findings, positive findings: disoriented to situational awareness   HEENT: normocephalic, EOMI  LUNGS: Equal chest rise and fall, nonlabored breathing, no tachypnea  HEART: normal rate and regular rhythm  ABDOMEN: soft, non-tender, non-distended and no guarding or peritoneal signs present  EXTERMITY: no cyanosis, clubbing or edema    I/O last 3 completed shifts: In: 2000 [P.O.:1990; I.V.:10]  Out: 600 [Urine:600]    Drain/tube output:  In: 2000 [P.O.:1990; I.V.:10]  Out: 600 [Urine:600]    LAB:  CBC:   Recent Labs     03/07/21  0559   WBC 9.6   HGB 13.1   HCT 43.1   MCV 92.9        BMP:   Recent Labs     03/06/21  1929 03/07/21  0559 03/08/21  0524   * 134* 139   K 3.7 3.7 3.9    103 108*   CO2 21 20 21   BUN 25* 25* 19   CREATININE 0.97* 0.76 0.66   GLUCOSE 160* 93 82     COAGS: No results for input(s): APTT, PROT, INR in the last 72 hours.     RADIOLOGY:      Baldomero Norris DO  3/9/21, 7:51 AM

## 2021-03-09 NOTE — PROGRESS NOTES
Restriction  Other position/activity restrictions: Up with assist. Left rib fractures 4, 5, 6  Subjective   General  Family / Caregiver Present: No  Subjective  Subjective: Pt and RN agreed to PT, pt supine in bed upon arrival, very confused. Pain Screening  Patient Currently in Pain: Yes  Pain Assessment  Pain Location: Rib cage  Pain Orientation: Left  Vital Signs  Patient Currently in Pain: Yes       Orientation  Orientation  Overall Orientation Status: Impaired  Orientation Level: Disoriented to time;Disoriented to place; Disoriented to situation;Oriented to person (Pt kept mentioning going up stairs to shower and change, did not know she was in the hospital or why)  Cognition      Objective   Bed mobility  Rolling to Right: Modified independent  Supine to Sit: Minimal assistance(trunk progression)  Scooting: Minimal assistance  Transfers  Sit to Stand: Contact guard assistance  Stand to sit: Contact guard assistance  Ambulation  Ambulation?: Yes  More Ambulation?: No  Ambulation 1  Surface: level tile  Device: Rolling Walker  Assistance: Minimal assistance  Gait Deviations: Deviated path;Decreased step height;Decreased arm swing  Distance: 10ft to the bathroom  Comments: pt very confused this date, impulsive, denies gait belt until educated on why it's appropriate, distance limited d/t pt wanting to shower, unsteady, but no LOB. Stairs/Curb  Stairs?: No     Balance  Sitting - Static: Fair  Sitting - Dynamic: Fair  Standing - Static: Fair;-  Standing - Dynamic: Poor  Comments: Standing balance assessed w/ RW, impulsive, Pt sat EOB 5' SBA. Exercises  Hip Flexion: Seated EOB marches x10  Knee Long Arc Quad: Seated EOB B x10  Ankle Pumps: B x10  Comments: limited by cognitive status      Goals  Short term goals  Time Frame for Short term goals: 14 visits  Short term goal 1: Sit to/from stand with supervision. Short term goal 2: Ambulate 150' with walker with SBA.   Short term goal 3: Improve standing balance to fair as seen by ability to stand statically without UE support for 30 seconds. Patient Goals   Patient goals : Go home    Plan    Plan  Times per week: 5-6x/week  Current Treatment Recommendations: Strengthening, Gait Training, Patient/Caregiver Education & Training, Functional Mobility Training, Endurance Training, Home Exercise Program, Transfer Training, Safety Education & Training  Safety Devices  Type of devices: All fall risk precautions in place, Call light within reach, Gait belt, Nurse notified(Left in the bathroom w/ sitter.)     Therapy Time   Individual Concurrent Group Co-treatment   Time In 0818         Time Out 0836         Minutes 18         Timed Code Treatment Minutes: [address] performed by Student PTA under the supervision of co-signing PTA who agrees with all treatment and documentation.    Brendan Capone, PTA

## 2021-03-09 NOTE — PLAN OF CARE
Problem: Falls - Risk of:  Goal: Will remain free from falls  Description: Will remain free from falls  Outcome: Ongoing  Goal: Absence of physical injury  Description: Absence of physical injury  Outcome: Ongoing     Problem: Skin Integrity:  Goal: Will show no infection signs and symptoms  Description: Will show no infection signs and symptoms  Outcome: Ongoing  Goal: Absence of new skin breakdown  Description: Absence of new skin breakdown  Outcome: Ongoing     Problem: Pain:  Goal: Pain level will decrease  Description: Pain level will decrease  Outcome: Ongoing  Goal: Control of acute pain  Description: Control of acute pain  Outcome: Ongoing  Goal: Control of chronic pain  Description: Control of chronic pain  Outcome: Ongoing     Problem: IP BALANCE  Goal: BALANCE EDUCATION  Description: Educate patients on maintaining dynamic/static standing/sitting balance, with/without upper extremity support.   Outcome: Ongoing     Problem: IP MOBILITY  Goal: LTG - patient will ambulate household distance  Outcome: Ongoing

## 2021-03-10 VITALS
BODY MASS INDEX: 22.09 KG/M2 | WEIGHT: 145.72 LBS | HEIGHT: 68 IN | SYSTOLIC BLOOD PRESSURE: 128 MMHG | DIASTOLIC BLOOD PRESSURE: 74 MMHG | RESPIRATION RATE: 18 BRPM | HEART RATE: 133 BPM | OXYGEN SATURATION: 95 % | TEMPERATURE: 98.4 F

## 2021-03-10 PROCEDURE — 2580000003 HC RX 258: Performed by: STUDENT IN AN ORGANIZED HEALTH CARE EDUCATION/TRAINING PROGRAM

## 2021-03-10 PROCEDURE — 6370000000 HC RX 637 (ALT 250 FOR IP): Performed by: STUDENT IN AN ORGANIZED HEALTH CARE EDUCATION/TRAINING PROGRAM

## 2021-03-10 PROCEDURE — 97116 GAIT TRAINING THERAPY: CPT

## 2021-03-10 PROCEDURE — 94760 N-INVAS EAR/PLS OXIMETRY 1: CPT

## 2021-03-10 PROCEDURE — 6370000000 HC RX 637 (ALT 250 FOR IP): Performed by: NURSE PRACTITIONER

## 2021-03-10 PROCEDURE — 6370000000 HC RX 637 (ALT 250 FOR IP): Performed by: EMERGENCY MEDICINE

## 2021-03-10 RX ORDER — LANOLIN ALCOHOL/MO/W.PET/CERES
6 CREAM (GRAM) TOPICAL NIGHTLY
Refills: 3 | DISCHARGE
Start: 2021-03-10 | End: 2021-03-20

## 2021-03-10 RX ORDER — QUETIAPINE FUMARATE 25 MG/1
50 TABLET, FILM COATED ORAL 2 TIMES DAILY
Status: DISCONTINUED | OUTPATIENT
Start: 2021-03-10 | End: 2021-03-10 | Stop reason: HOSPADM

## 2021-03-10 RX ORDER — POLYETHYLENE GLYCOL 3350 17 G/17G
17 POWDER, FOR SOLUTION ORAL DAILY
Status: DISCONTINUED | OUTPATIENT
Start: 2021-03-10 | End: 2021-03-10 | Stop reason: HOSPADM

## 2021-03-10 RX ORDER — TRAZODONE HYDROCHLORIDE 100 MG/1
100 TABLET ORAL NIGHTLY
Qty: 10 TABLET | Refills: 0 | DISCHARGE
Start: 2021-03-10 | End: 2021-03-20

## 2021-03-10 RX ORDER — TRAZODONE HYDROCHLORIDE 100 MG/1
100 TABLET ORAL NIGHTLY
Status: DISCONTINUED | OUTPATIENT
Start: 2021-03-10 | End: 2021-03-10 | Stop reason: HOSPADM

## 2021-03-10 RX ORDER — ALBUTEROL SULFATE 2.5 MG/3ML
2.5 SOLUTION RESPIRATORY (INHALATION) EVERY 6 HOURS
Qty: 120 EACH | Refills: 3 | DISCHARGE
Start: 2021-03-10 | End: 2021-03-20

## 2021-03-10 RX ORDER — QUETIAPINE FUMARATE 50 MG/1
50 TABLET, FILM COATED ORAL 2 TIMES DAILY
Qty: 14 TABLET | Refills: 0 | DISCHARGE
Start: 2021-03-10 | End: 2021-03-17

## 2021-03-10 RX ADMIN — QUETIAPINE FUMARATE 50 MG: 25 TABLET ORAL at 10:01

## 2021-03-10 RX ADMIN — SODIUM CHLORIDE, PRESERVATIVE FREE 10 ML: 5 INJECTION INTRAVENOUS at 09:54

## 2021-03-10 RX ADMIN — ACETAMINOPHEN 1000 MG: 500 TABLET ORAL at 10:00

## 2021-03-10 RX ADMIN — Medication 5 MG: at 02:49

## 2021-03-10 RX ADMIN — MEMANTINE HYDROCHLORIDE 10 MG: 5 TABLET, FILM COATED ORAL at 10:02

## 2021-03-10 ASSESSMENT — PAIN SCALES - GENERAL
PAINLEVEL_OUTOF10: 2
PAINLEVEL_OUTOF10: 4
PAINLEVEL_OUTOF10: 2

## 2021-03-10 ASSESSMENT — PAIN DESCRIPTION - LOCATION
LOCATION: RIB CAGE
LOCATION: RIB CAGE

## 2021-03-10 ASSESSMENT — PAIN DESCRIPTION - PAIN TYPE
TYPE: CHRONIC PAIN
TYPE: CHRONIC PAIN
TYPE: ACUTE PAIN

## 2021-03-10 ASSESSMENT — PAIN DESCRIPTION - ORIENTATION
ORIENTATION: LEFT

## 2021-03-10 NOTE — PROGRESS NOTES
Occupational Therapy    Occupational Therapy Not Seen Note    DATE: 3/10/2021  Name: Tre Lazaro  : 1935  MRN: [id number]    Patient not available for Occupational Therapy due to: Other: Pt confused and not willing to participate in OT on this date.     Electronically signed by Jimi PETER on 3/10/2021 at 12:12 PM

## 2021-03-10 NOTE — PLAN OF CARE
Problem: Falls - Risk of:  Goal: Will remain free from falls  Description: Will remain free from falls  3/10/2021 0618 by Nathanial Barthel, RN  Outcome: Ongoing  3/9/2021 1707 by Ovidio Currie RN  Outcome: Ongoing  Goal: Absence of physical injury  Description: Absence of physical injury  3/10/2021 0618 by Nathanial Barthel, RN  Outcome: Ongoing  3/9/2021 1707 by Ovidio Currie RN  Outcome: Ongoing     Problem: Skin Integrity:  Goal: Will show no infection signs and symptoms  Description: Will show no infection signs and symptoms  3/10/2021 0618 by Nathanial Barthel, RN  Outcome: Ongoing  3/9/2021 1707 by Ovidio Currie RN  Outcome: Ongoing  Goal: Absence of new skin breakdown  Description: Absence of new skin breakdown  3/10/2021 0618 by Nathanial Barthel, RN  Outcome: Ongoing  3/9/2021 1707 by Ovidio Currie RN  Outcome: Ongoing     Problem: Pain:  Goal: Pain level will decrease  Description: Pain level will decrease  3/10/2021 0618 by Nathanial Barthel, RN  Outcome: Ongoing  3/9/2021 1707 by Ovidio Currie RN  Outcome: Ongoing  Goal: Control of acute pain  Description: Control of acute pain  3/10/2021 0618 by Nathanial Barthel, RN  Outcome: Ongoing  3/9/2021 1707 by Ovidio Currie RN  Outcome: Ongoing  Goal: Control of chronic pain  Description: Control of chronic pain  3/10/2021 0618 by Nathanial Barthel, RN  Outcome: Ongoing  3/9/2021 1707 by Ovidio Currie RN  Outcome: Ongoing     Problem: IP BALANCE  Goal: BALANCE EDUCATION  Description: Educate patients on maintaining dynamic/static standing/sitting balance, with/without upper extremity support.   3/10/2021 0618 by Nathanial Barthel, RN  Outcome: Ongoing  3/9/2021 1707 by Ovidio Currie RN  Outcome: Ongoing     Problem: IP MOBILITY  Goal: LTG - patient will ambulate household distance  3/10/2021 0618 by Nathanial Barthel, RN  Outcome: Ongoing  3/9/2021 1707 by Ovidio Currie RN  Outcome: Ongoing

## 2021-03-10 NOTE — PROGRESS NOTES
PROGRESS NOTE    PATIENT NAME:  [facility] RECORD NO. [id number]  DATE: 3/10/2021  SURGEON: Demetria Ramsey  PRIMARY CARE PHYSICIAN: Luis Salguero    HD: # 5    ASSESSMENT    Patient Active Problem List   Diagnosis    Hx of fracture of rib       MEDICAL DECISION MAKING AND PLAN    Plan:  1. Neuro- Analgesia multimodal pain therapy, dementia meds resumed   2. CV- continue to monitor BP/HR  3. Resp- HF at night, continue to encourage incentive spirometry, acapella, and deep breathing every hour while awake per RT and RN  4. GI- Diet gen, continue bowel regimen,  monitor for bowel function,   5. Renal- jan UOP, accurate I/Os, replace electrolytes PRN, KVO  6. Msk- continue to encourage ambulation/activity, PT/OT eval and Tx   7. ID- jan for fevers; Ppx- lovenox DVT ppx, endo- jan BG  8. Dispo planning:  Await acceptance to facility, pt has been without bedside sitter since 3/9    SUBJECTIVE    Connie Krueger is unchanged this am from yesterday. Family members stayed overnight with pt. Pt still having issues with orientation overnight. Bedside sitter has been off since yesterday (3/9)     OBJECTIVE  VITALS: Temp: Temp: 98.2 °F (36.8 °C)Temp  Av °F (36.7 °C)  Min: 97.7 °F (36.5 °C)  Max: 98.2 °F (20.1 °C) BP Systolic (17MCV), ERO:310 , Min:142 , [place]:975   Diastolic (21UBA), ARZ:11, Min:86, Max:99   Pulse Pulse  Av  Min: 105  Max: 117 Resp Resp  Av  Min: 16  Max: 18 Pulse ox SpO2  Av %  Min: 96 %  Max: 96 %  GENERAL: alert, no distress  NEURO: No focal findings, positive findings: disoriented to situational awareness   HEENT: normocephalic, EOMI  LUNGS: Equal chest rise and fall, nonlabored breathing, no tachypnea  HEART: normal rate and regular rhythm  ABDOMEN: soft, non-tender, non-distended and no guarding or peritoneal signs present  EXTERMITY: no cyanosis, clubbing or edema    I/O last 3 completed shifts:   In: 20 [I.V.:20]  Out: 650 [Urine:650]    Drain/tube output:  In: 20 [I.V.:20]  Out: 650 [Urine:650]    LAB:  CBC:   No results for input(s): WBC, HGB, HCT, MCV, PLT in the last 72 hours. BMP:   Recent Labs     03/08/21  0524      K 3.9   *   CO2 21   BUN 19   CREATININE 0.66   GLUCOSE 82     COAGS: No results for input(s): APTT, PROT, INR in the last 72 hours.     RADIOLOGY:    Soraida Hernandes DO  3/9/21, 8:04 AM

## 2021-03-10 NOTE — CARE COORDINATION
Transition planning  Spoke with Mary Guillen to let her know that sitter has been discontinued yesterday at 5. Can set up transport to return after that time. Nursing report number [phone]  Faxed request for transportation at 5  Faxed H&P, Hens, Covid acknowledgement form, results, MAR And AVS to facility.  Blue packet complete and ready at

## 2021-03-10 NOTE — PROGRESS NOTES
Patient [facility] [address] to [facility] with all belongings. Discharge packet provided to transport. One IV removed with no complications and catheter intact. Report called to [facility]. All questions answered.

## 2021-03-10 NOTE — PROGRESS NOTES
Physical Therapy  Facility/Department: [facility]  Daily Treatment Note  NAME: Lauren Barger  : 1935  MRN: [id number]    Date of Service: 3/10/2021    Discharge Recommendations:  Patient would benefit from continued therapy after discharge   PT Equipment Recommendations  Equipment Needed: Yes  Mobility Devices: Alvia Herd: Rolling    Assessment   Body structures, Functions, Activity limitations: Decreased functional mobility ; Decreased endurance;Decreased strength;Decreased coordination  Assessment: Pt amb  120ft w/ RW and Jignesh, Pt limited by fatigue , also cognitive status. Recommend 24 hour assistance in her current condition to prevent another fall and injury. Pt would benefit from continued PT to address balance and strength deficits. Prognosis: Good  PT Education: Goals;Transfer Training;PT Role;Functional Mobility Training;General Safety;Plan of Care;Gait Training  REQUIRES PT FOLLOW UP: Yes  Activity Tolerance  Activity Tolerance: Patient limited by cognitive status; Patient limited by fatigue;Patient limited by pain     Patient Diagnosis(es): The encounter diagnosis was Closed fracture of multiple ribs of left side, initial encounter. has a past medical history of Breast cancer ([facility] [address] Dupuytren contracture, Hyperlipidemia, Malignant neoplasm of breast (female) ([facility] [address] Rib fractures, Short-term memory loss, Vertigo, and Vitamin D deficiency. has a past surgical history that includes Mastectomy (Right, ); Tonsillectomy (); Stapedes surgery (Bilateral); Foot surgery (); Dilation and curettage of uterus (); Hand surgery (0294/5586); Breast biopsy (Right, ); Breast reconstruction (Right, ); Cataract removal; Breast biopsy (Left, ); Colonoscopy; and Breast Implant Removal (2009). Restrictions  Restrictions/Precautions  Restrictions/Precautions: Fall Risk  Position Activity Restriction  Other position/activity restrictions:  Up with assist. Left rib fractures 4, 5, 6  Subjective   General  Response To Previous Treatment: Patient with no complaints from previous session. Family / Caregiver Present: No  Subjective  Subjective: Pt and RN agreed to PT, pt up in chair upon arrival,pleasantly confused  Pain Screening  Patient Currently in Pain: Yes  Pain Assessment  Pain Level: 2  Pain Type: Chronic pain  Pain Location: Rib cage  Pain Orientation: Left  Pain Descriptors: Aching  Non-Pharmaceutical Pain Intervention(s): Ambulation/Increased Activity; Distraction;Rest;Repositioned  Vital Signs  Patient Currently in Pain: Yes       Orientation  Orientation  Overall Orientation Status: Impaired  Orientation Level: Disoriented to time;Disoriented to place; Disoriented to situation;Oriented to person  Cognition      Objective   Bed mobility  Comment: Pt up in chair upon arrival and exit. Transfers  Sit to Stand: Contact guard assistance  Stand to sit: Contact guard assistance  Comment: Pt is impulsive and confused requiring constant cueing for safety and redirection. Ambulation  Ambulation?: Yes  More Ambulation?: No  Ambulation 1  Surface: level tile  Device: Rolling Walker  Assistance: Minimal assistance  Quality of Gait: Cuaring for RW management. Gait Deviations: Deviated path;Decreased step height;Decreased arm swing  Distance: 120ft  Comments: Limited by fatigue and pain. Balance  Sitting - Static: Fair  Sitting - Dynamic: Fair  Standing - Static: Fair;-  Standing - Dynamic: Poor  Comments: Standing balance assessed w/ RW,  Exercises  Comments: Pt reported fatigue. Goals  Short term goals  Time Frame for Short term goals: 14 visits  Short term goal 1: Sit to/from stand with supervision. Short term goal 2: Ambulate 150' with walker with SBA. Short term goal 3: Improve standing balance to fair as seen by ability to stand statically without UE support for 30 seconds.   Patient Goals   Patient goals : Go home    Plan    Plan  Times per week: 5-6x/week  Current Treatment Recommendations: Strengthening, Gait Training, Patient/Caregiver Education & Training, Functional Mobility Training, Endurance Training, Home Exercise Program, Transfer Training, Safety Education & Training  Safety Devices  Type of devices:  All fall risk precautions in place, Call light within reach, Gait belt, Nurse notified, Chair alarm in place, Left in chair     Therapy Time   Individual Concurrent Group Co-treatment   Time In 1041         Time Out 1100         Minutes 19         Timed Code Treatment Minutes: Christine PTA

## 2021-03-11 NOTE — PROGRESS NOTES
Physician Progress Note      Nirali Orozco  CSN #:                  [id number]  :                       1935  ADMIT DATE:       3/5/2021 5:10 PM  DISCH DATE:        3/10/2021 5:45 PM  RESPONDING  PROVIDER #:        Cecilia Rm CNP          QUERY TEXT:    Pt admitted with rib fracture s/p fall  and has  dementia documented. Pt   noted to verbally  aggressive, agitated, and uncooperative. . If possible,   please document in the progress notes and discharge summary if you are   evaluating and / or treating any of the following: The medical record reflects the following:  Risk Factors: history of dementia  Clinical Indicators: per progress notes on 3/9 and 3/10 verbally aggressive   and uncooperative requiring a bedside sitter. per nursing oriented to person   only with poor judgment and safety awareness  Treatment: home dose Namenda . ordered Seroquel and desyrel    Thank you Yrn Cortez RN CCDS BSN. . call if questions [phone]  Options provided:  -- Dementia without behavioral disturbance  -- Dementia with behavioral disturbance  -- Other - I will add my own diagnosis  -- Disagree - Not applicable / Not valid  -- Disagree - Clinically unable to determine / Unknown  -- Refer to Clinical Documentation Reviewer    PROVIDER RESPONSE TEXT:    This patient has dementia with behavioral disturbances. Query created by:  Nathalie Osgood on 3/10/2021 1:46 PM      Electronically signed by:  Cecilia Rm CNP 3/11/2021 9:42 AM

## 2021-03-16 NOTE — DISCHARGE SUMMARY
DISCHARGE SUMMARY:    PATIENT NAME:  Citlalli Mcbride  YOB: 1935  MEDICAL RECORD NO. [id number]  DATE: 03/16/21  PRIMARY CARE PHYSICIAN: Janna Cervantes  ADMIT DATE:  3/5/2021    DISCHARGE DATE:  3/10/2021  DISPOSITION:  facility  ADMITTING DIAGNOSIS:   f    DIAGNOSIS:   Patient Active Problem List   Diagnosis    Hx of fracture of rib       CONSULTANTS:  Fall from standing with rib fracture    PROCEDURES:   none    HOSPITAL COURSE:   Citlalli Mcbride is a 80 y.o. female who was admitted on 3/5/2021  Hospital Course:  Patient presents to ED via EMS from [facility] for trauma consult secondary to rib fractures s/p fall. Patient lives in a [facility] home where she has meals brought to her. Patient states she fell sometime last night or this morning (does not remember due to short term memory loss). Patient denies hitting her head. No LOC. Patient c/o left posterior rib pain. Patient had work up at [facility] including multiple xrays showing multiple left sided rib fractures and CT of head and neck that were negative. Patient denies any other symptoms. patient stated that she does not have them but would want her both daughters Opal Love and Zheng Cisneros to be equally involved in the decision making for her health if need arises and I respected her wishes    She had problems with confusion and agitation and required a sitter, this improved with medications and her daughters presence. Labs and imaging were followed daily. On day of discharge Citlalli Mcbride  was tolerating a regular diet  had adequate analgeia on oral medications  had no signs of complication. She was deemed medically stable for discharged to [address] [place] [address]:        Discharge Vitals:  height is 5' 8\" (1.727 m) and weight is 145 lb 11.6 oz (66.1 kg). Her oral temperature is 98.4 °F (36.9 °C). Her blood pressure is 128/74 and her pulse is 133. Her respiration is 18 and oxygen saturation is 95%.    Exam on day of discharge:  Plan:  1. Neuro- Analgesia multimodal pain therapy, dementia meds resumed   2. CV- continue to monitor BP/HR  3. Resp- HF at night, continue to encourage incentive spirometry, acapella, and deep breathing every hour while awake per RT and RN  4. GI- Diet gen, continue bowel regimen,  monitor for bowel function,   5. Renal- jan UOP, accurate I/Os, replace electrolytes PRN, KVO  6. Msk- continue to encourage ambulation/activity, PT/OT eval and Tx   7. ID- jan for fevers; Ppx- lovenox DVT ppx, endo- jan BG  8. Dispo planning:  Await acceptance to facility, pt has been without bedside sitter since 3/9     SUBJECTIVE     Bharati Joyce is unchanged this am from yesterday. Family members stayed overnight with pt. Pt still having issues with orientation overnight. Bedside sitter has been off since yesterday (3/9)      OBJECTIVE  VITALS: Temp: Temp: 98.2 °F (36.8 °C)Temp  Av °F (36.7 °C)  Min: 97.7 °F (36.5 °C)  Max: 98.2 °F (72.3 °C) BP Systolic (32HPG), QXH:003 , Min:142 , QKC:692   Diastolic (54XQA), BYX:72, Min:86, Max:99   Pulse Pulse  Av  Min: 105  Max: 117 Resp Resp  Av  Min: 16  Max: 18 Pulse ox SpO2  Av %  Min: 96 %  Max: 96 %  GENERAL: alert, no distress  NEURO: No focal findings, positive findings: disoriented to situational awareness   HEENT: normocephalic, EOMI  LUNGS: Equal chest rise and fall, nonlabored breathing, no tachypnea  HEART: normal rate and regular rhythm  ABDOMEN: soft, non-tender, non-distended and no guarding or peritoneal signs present  EXTERMITY: no cyanosis, clubbing or edema       LABS:   No results for input(s): WBC, HGB, HCT, PLT, NA, K, CL, CO2, BUN, CREATININE in the last 72 hours. DIAGNOSTIC TESTS:    Xr Shoulder Right (min 2 Views)    Result Date: 3/6/2021  EXAMINATION: THREE XRAY VIEWS OF THE RIGHT KNEE; THREE XRAY VIEWS OF THE RIGHT SHOULDER 3/6/2021 10:56 am COMPARISON: None.  HISTORY: ORDERING SYSTEM PROVIDED HISTORY: pain, fall TECHNOLOGIST PROVIDED HISTORY: pain, fall 77-year-old female with acute right knee and right shoulder pain after a fall FINDINGS: Right shoulder: Surgical clips project over the right axillary region. Tiny ossific or calcific density overlying the superior glenohumeral joint measuring 3 mm could represent a loose body or sequela of remote trauma. Cardiac monitor leads overlie the chest.  Atherosclerotic calcification of the aorta. Hazy opacity projecting over the right lower lung zone, atelectasis and/or infiltrate. Visualized right-sided ribs appear intact. No acute fracture or dislocation. Mild degenerative changes of the right AC and glenohumeral joints. Right knee: Diffuse osteopenia. Mild tricompartmental osteophyte spurring. Small joint effusion. Moderate narrowing of the lateral compartment. Mild narrowing of the patellofemoral and medial compartments. No suspicious osteolytic or osteoblastic lesions. No acute fracture or dislocation. Loose bodies in the posterior joint recess measuring up to 5 mm. Atherosclerotic calcification of the vasculature. Right shoulder: 1. Mild degenerative changes as detailed above. 2. Right basilar atelectasis and/or infiltrate. 3. No acute fracture or dislocation. Right knee: 1. Tricompartmental osteoarthrosis. Moderate narrowing of the lateral compartment. Small joint effusion. Diffuse osteopenia. 2. Posterior joint recess loose bodies measuring up to 5 mm. 3. No acute fracture or dislocation. Xr Knee Right (3 Views)    Result Date: 3/6/2021  EXAMINATION: THREE XRAY VIEWS OF THE RIGHT KNEE; THREE XRAY VIEWS OF THE RIGHT SHOULDER 3/6/2021 10:56 am COMPARISON: None. HISTORY: ORDERING SYSTEM PROVIDED HISTORY: pain, fall TECHNOLOGIST PROVIDED HISTORY: pain, fall 77-year-old female with acute right knee and right shoulder pain after a fall FINDINGS: Right shoulder: Surgical clips project over the right axillary region.   Tiny ossific or calcific density overlying the superior given differences in modality. 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 Ct Chest Abdomen Pelvis W Contrast    Result Date: 3/5/2021  EXAMINATION: CT OF THE CHEST, ABDOMEN, AND PELVIS WITH CONTRAST 3/5/2021 5:55 pm TECHNIQUE: CT of the chest, abdomen and pelvis was performed with the administration of intravenous contrast. Multiplanar reformatted images are provided for review. Dose modulation, iterative reconstruction, and/or weight based adjustment of the mA/kV was utilized to reduce the radiation dose to as low as reasonably achievable. COMPARISON: None HISTORY: ORDERING SYSTEM PROVIDED HISTORY: s/p fall, L rib Fxs TECHNOLOGIST PROVIDED HISTORY: s/p fall, L rib Fxs Reason for Exam: fall FINDINGS: Chest: Mediastinum: The heart enlarged. No pericardial effusion. The thoracic aorta and proximal great vessels are patent and normal in caliber. No acute aortic abnormality, intramural hematoma, or mediastinal hematoma. The central pulmonary arteries are patent and free of embolism. No enlarged or suspicious axillary, hilar, or mediastinal lymphadenopathy. Lungs/pleura: The trachea and mainstem bronchi are widely patent. Minimal atelectasis is present at both lung bases. The lungs are otherwise clear. No discrete pulmonary nodule or mass. No pleural effusion or pneumothorax. Soft Tissues/Bones: Degenerative changes are present throughout the thoracic spine. There are acute minimally displaced fractures of the left lateral 4th, 5th, and 6th ribs. No acute fracture. A right breast implant is noted. No chest wall hematoma. Abdomen/Pelvis: Organs: A few small benign bilateral renal cortical cysts are present. The liver, spleen, pancreas, kidneys, gallbladder, and adrenal glands appear normal. GI/Bowel: Small hiatal hernia. A few scattered diverticula are present throughout the sigmoid colon.   The stomach and the small and large bowel loops are otherwise is the normal lumbar lordosis. DEGENERATIVE CHANGES: There is mild multilevel lumbar degenerative disc disease throughout, moderate at L5-S1. Moderate facet hypertrophic changes present bilaterally at L4-L5 and L5-S1. No significant bony neural foraminal narrowing. SOFT TISSUES: No paraspinal soft tissue abnormality. Moderate multilevel lumbar spondylosis, pronounced most at L4-L5 and L5-S1. Slight degenerative anterolisthesis of L4 on L5. No acute fracture. Ct Thoracic Spine Trauma Reconstruction    Result Date: 3/5/2021  EXAMINATION: CT OF THE THORACIC SPINE WITHOUT CONTRAST  3/5/2021 5:55 pm: TECHNIQUE: CT of the thoracic spine was performed without the administration of intravenous contrast. Multiplanar reformatted images are provided for review. Dose modulation, iterative reconstruction, and/or weight based adjustment of the mA/kV was utilized to reduce the radiation dose to as low as reasonably achievable. COMPARISON: None. HISTORY: ORDERING SYSTEM PROVIDED HISTORY: trauma TECHNOLOGIST PROVIDED HISTORY: trauma FINDINGS: BONES/ALIGNMENT:   Bone mineralization is normal.  The vertebral bodies and posterior elements appear intact and appropriately aligned without acute fracture or subluxation. Vertebral body stature is maintained throughout as is the normal thoracic kyphosis. DEGENERATIVE CHANGES: There is mild multilevel thoracic degenerative disc disease. No significant bony neural foraminal narrowing. SOFT TISSUES: No paraspinal soft tissue abnormality. Mild multilevel thoracic degenerative disc disease. No acute fracture or subluxation.        DISCHARGE INSTRUCTIONS     Discharge Medications:        Medication List      START taking these medications    albuterol (2.5 MG/3ML) 0.083% nebulizer solution  Commonly known as: PROVENTIL  Take 3 mLs by nebulization every 6 hours for 10 days     enoxaparin 30 MG/0.3ML injection  Commonly known as: LOVENOX  Inject 0.3 mLs into the skin 2 times daily for 10 days     melatonin 3 MG Tabs tablet  Take 2 tablets by mouth nightly for 10 doses     QUEtiapine 50 MG tablet  Commonly known as: SEROQUEL  Take 1 tablet by mouth 2 times daily for 7 days     traZODone 100 MG tablet  Commonly known as: DESYREL  Take 1 tablet by mouth nightly for 10 days        CONTINUE taking these medications    donepezil 10 MG tablet  Commonly known as: ARICEPT     Namenda XR 28 MG Cp24 extended release capsule  Generic drug: memantine ER           Where to Get Your Medications      Information about where to get these medications is not yet available    Ask your nurse or doctor about these medications  · albuterol (2.5 MG/3ML) 0.083% nebulizer solution  · enoxaparin 30 MG/0.3ML injection  · melatonin 3 MG Tabs tablet  · QUEtiapine 50 MG tablet  · traZODone 100 MG tablet       Diet: No diet orders on file diet as tolerated  Activity: As instructed WEIGHT BEARING STATUS: Weight bearing as tolerated  Wound Care: Daily and as needed.     DISPOSITION: [facility]    Follow-up:  Joel Bowles [address] [place] [address]  [phone]              SIGNED:  SHARRI Brito CNP   3/16/2021, 4:14 PM  Time Spent for discharge: 35 minutes